# Patient Record
Sex: MALE | Race: WHITE | NOT HISPANIC OR LATINO
[De-identification: names, ages, dates, MRNs, and addresses within clinical notes are randomized per-mention and may not be internally consistent; named-entity substitution may affect disease eponyms.]

---

## 2017-03-03 ENCOUNTER — TRANSCRIPTION ENCOUNTER (OUTPATIENT)
Age: 47
End: 2017-03-03

## 2017-03-30 ENCOUNTER — EMERGENCY (EMERGENCY)
Facility: HOSPITAL | Age: 47
LOS: 1 days | Discharge: HOME | End: 2017-03-30
Attending: EMERGENCY MEDICINE | Admitting: EMERGENCY MEDICINE
Payer: COMMERCIAL

## 2017-03-30 VITALS
SYSTOLIC BLOOD PRESSURE: 171 MMHG | TEMPERATURE: 100 F | RESPIRATION RATE: 18 BRPM | DIASTOLIC BLOOD PRESSURE: 97 MMHG | WEIGHT: 240.08 LBS | HEIGHT: 78 IN | HEART RATE: 67 BPM

## 2017-03-30 DIAGNOSIS — Z88.0 ALLERGY STATUS TO PENICILLIN: ICD-10-CM

## 2017-03-30 DIAGNOSIS — H33.021 RETINAL DETACHMENT WITH MULTIPLE BREAKS, RIGHT EYE: Chronic | ICD-10-CM

## 2017-03-30 DIAGNOSIS — R07.89 OTHER CHEST PAIN: ICD-10-CM

## 2017-03-30 DIAGNOSIS — Z98.890 OTHER SPECIFIED POSTPROCEDURAL STATES: ICD-10-CM

## 2017-03-30 DIAGNOSIS — H33.052 TOTAL RETINAL DETACHMENT, LEFT EYE: Chronic | ICD-10-CM

## 2017-03-30 DIAGNOSIS — J45.909 UNSPECIFIED ASTHMA, UNCOMPLICATED: ICD-10-CM

## 2017-03-30 LAB
ALBUMIN SERPL ELPH-MCNC: 4.5 G/DL — SIGNIFICANT CHANGE UP (ref 3.3–5)
ALP SERPL-CCNC: 73 U/L — SIGNIFICANT CHANGE UP (ref 40–120)
ALT FLD-CCNC: 30 U/L RC — SIGNIFICANT CHANGE UP (ref 10–45)
ANION GAP SERPL CALC-SCNC: 15 MMOL/L — SIGNIFICANT CHANGE UP (ref 5–17)
AST SERPL-CCNC: 27 U/L — SIGNIFICANT CHANGE UP (ref 10–40)
BASOPHILS # BLD AUTO: 0.1 K/UL — SIGNIFICANT CHANGE UP (ref 0–0.2)
BASOPHILS NFR BLD AUTO: 0.8 % — SIGNIFICANT CHANGE UP (ref 0–2)
BILIRUB SERPL-MCNC: 0.3 MG/DL — SIGNIFICANT CHANGE UP (ref 0.2–1.2)
BUN SERPL-MCNC: 11 MG/DL — SIGNIFICANT CHANGE UP (ref 7–23)
CALCIUM SERPL-MCNC: 9.4 MG/DL — SIGNIFICANT CHANGE UP (ref 8.4–10.5)
CHLORIDE SERPL-SCNC: 100 MMOL/L — SIGNIFICANT CHANGE UP (ref 96–108)
CK MB BLD-MCNC: 1.5 % — SIGNIFICANT CHANGE UP (ref 0–3.5)
CK MB CFR SERPL CALC: 1.9 NG/ML — SIGNIFICANT CHANGE UP (ref 0–6.7)
CK SERPL-CCNC: 130 U/L — SIGNIFICANT CHANGE UP (ref 30–200)
CO2 SERPL-SCNC: 28 MMOL/L — SIGNIFICANT CHANGE UP (ref 22–31)
CREAT SERPL-MCNC: 0.81 MG/DL — SIGNIFICANT CHANGE UP (ref 0.5–1.3)
EOSINOPHIL # BLD AUTO: 0.2 K/UL — SIGNIFICANT CHANGE UP (ref 0–0.5)
EOSINOPHIL NFR BLD AUTO: 2.6 % — SIGNIFICANT CHANGE UP (ref 0–6)
GAS PNL BLDV: SIGNIFICANT CHANGE UP
GLUCOSE SERPL-MCNC: 129 MG/DL — HIGH (ref 70–99)
HCT VFR BLD CALC: 42.6 % — SIGNIFICANT CHANGE UP (ref 39–50)
HGB BLD-MCNC: 14.4 G/DL — SIGNIFICANT CHANGE UP (ref 13–17)
LYMPHOCYTES # BLD AUTO: 3.2 K/UL — SIGNIFICANT CHANGE UP (ref 1–3.3)
LYMPHOCYTES # BLD AUTO: 46.9 % — HIGH (ref 13–44)
MCHC RBC-ENTMCNC: 27.3 PG — SIGNIFICANT CHANGE UP (ref 27–34)
MCHC RBC-ENTMCNC: 33.9 GM/DL — SIGNIFICANT CHANGE UP (ref 32–36)
MCV RBC AUTO: 80.5 FL — SIGNIFICANT CHANGE UP (ref 80–100)
MONOCYTES # BLD AUTO: 0.8 K/UL — SIGNIFICANT CHANGE UP (ref 0–0.9)
MONOCYTES NFR BLD AUTO: 11.4 % — SIGNIFICANT CHANGE UP (ref 2–14)
NEUTROPHILS # BLD AUTO: 2.6 K/UL — SIGNIFICANT CHANGE UP (ref 1.8–7.4)
NEUTROPHILS NFR BLD AUTO: 38.3 % — LOW (ref 43–77)
PLATELET # BLD AUTO: 177 K/UL — SIGNIFICANT CHANGE UP (ref 150–400)
POTASSIUM SERPL-MCNC: 3.8 MMOL/L — SIGNIFICANT CHANGE UP (ref 3.5–5.3)
POTASSIUM SERPL-SCNC: 3.8 MMOL/L — SIGNIFICANT CHANGE UP (ref 3.5–5.3)
PROT SERPL-MCNC: 6.9 G/DL — SIGNIFICANT CHANGE UP (ref 6–8.3)
RBC # BLD: 5.29 M/UL — SIGNIFICANT CHANGE UP (ref 4.2–5.8)
RBC # FLD: 11.8 % — SIGNIFICANT CHANGE UP (ref 10.3–14.5)
SODIUM SERPL-SCNC: 143 MMOL/L — SIGNIFICANT CHANGE UP (ref 135–145)
TROPONIN T SERPL-MCNC: <0.01 NG/ML — SIGNIFICANT CHANGE UP (ref 0–0.06)
WBC # BLD: 6.8 K/UL — SIGNIFICANT CHANGE UP (ref 3.8–10.5)
WBC # FLD AUTO: 6.8 K/UL — SIGNIFICANT CHANGE UP (ref 3.8–10.5)

## 2017-03-30 PROCEDURE — 93010 ELECTROCARDIOGRAM REPORT: CPT | Mod: 59

## 2017-03-30 PROCEDURE — 85014 HEMATOCRIT: CPT

## 2017-03-30 PROCEDURE — 80053 COMPREHEN METABOLIC PANEL: CPT

## 2017-03-30 PROCEDURE — 82435 ASSAY OF BLOOD CHLORIDE: CPT

## 2017-03-30 PROCEDURE — 85027 COMPLETE CBC AUTOMATED: CPT

## 2017-03-30 PROCEDURE — 93005 ELECTROCARDIOGRAM TRACING: CPT

## 2017-03-30 PROCEDURE — 84295 ASSAY OF SERUM SODIUM: CPT

## 2017-03-30 PROCEDURE — 83605 ASSAY OF LACTIC ACID: CPT

## 2017-03-30 PROCEDURE — 82330 ASSAY OF CALCIUM: CPT

## 2017-03-30 PROCEDURE — 84484 ASSAY OF TROPONIN QUANT: CPT

## 2017-03-30 PROCEDURE — 82553 CREATINE MB FRACTION: CPT

## 2017-03-30 PROCEDURE — 82947 ASSAY GLUCOSE BLOOD QUANT: CPT

## 2017-03-30 PROCEDURE — 94640 AIRWAY INHALATION TREATMENT: CPT

## 2017-03-30 PROCEDURE — 99284 EMERGENCY DEPT VISIT MOD MDM: CPT | Mod: 25

## 2017-03-30 PROCEDURE — 82550 ASSAY OF CK (CPK): CPT

## 2017-03-30 PROCEDURE — 84132 ASSAY OF SERUM POTASSIUM: CPT

## 2017-03-30 PROCEDURE — 82803 BLOOD GASES ANY COMBINATION: CPT

## 2017-03-30 PROCEDURE — 99285 EMERGENCY DEPT VISIT HI MDM: CPT | Mod: 25

## 2017-03-30 RX ORDER — IPRATROPIUM/ALBUTEROL SULFATE 18-103MCG
3 AEROSOL WITH ADAPTER (GRAM) INHALATION ONCE
Qty: 0 | Refills: 0 | Status: COMPLETED | OUTPATIENT
Start: 2017-03-30 | End: 2017-03-30

## 2017-03-30 RX ORDER — SODIUM CHLORIDE 9 MG/ML
1000 INJECTION INTRAMUSCULAR; INTRAVENOUS; SUBCUTANEOUS ONCE
Qty: 0 | Refills: 0 | Status: COMPLETED | OUTPATIENT
Start: 2017-03-30 | End: 2017-03-30

## 2017-03-30 RX ADMIN — SODIUM CHLORIDE 1000 MILLILITER(S): 9 INJECTION INTRAMUSCULAR; INTRAVENOUS; SUBCUTANEOUS at 03:47

## 2017-03-30 RX ADMIN — Medication 3 MILLILITER(S): at 03:47

## 2017-03-30 RX ADMIN — Medication 60 MILLIGRAM(S): at 05:16

## 2017-03-30 NOTE — ED PROVIDER NOTE - ATTENDING CONTRIBUTION TO CARE
46M w/ PMH chronic back pain, asthma p/w sob for the past 3 hours. labs, ivf, nebs, cardiac enzymes, cxr

## 2017-03-30 NOTE — ED ADULT NURSE NOTE - PSH
Recent retinal detachment, total, left  S/P gas bubble injection 05/21/2014  Recent total retinal detachment, left  S/p l;aser treatment 05/23/2014  Retinal detachment of right eye with multiple breaks  S/P repair- 05/05/2014

## 2017-03-30 NOTE — ED PROVIDER NOTE - OBJECTIVE STATEMENT
46M w/ PMH chronic back pain, asthma p/w sob for the past 3 hours. Pt woke up catching his breath, took his inhaler and walked outside to minimal relief. He had no chest pain, loc, dizziness. He feels like his throat is somewhat sore and hoarse. Denies, fever, chills, cough, runny nose.

## 2017-03-30 NOTE — ED ADULT NURSE NOTE - CHPI ED SYMPTOMS NEG
no fever/no body aches/no cough/no headache/no wheezing/no diaphoresis/no chills/no hemoptysis/no chest pain/no edema

## 2017-03-30 NOTE — ED ADULT NURSE NOTE - OBJECTIVE STATEMENT
46 year old male presenting to the ED complaining of waking up with SOB. Pt is A&O x 4, ambulates independently, VSS Pt sating 98% on RA, lung sounds clear. Pt denies chest pain, pain radiating to chest, shoulder, jaw, arm or back. Pt denies recent fever, chills, NVD. No edema of extremities noted.  Pt speaking connie, resting comfortably, no SS of respiratory distress. EGK completed, bed in low position, IV placed, labs drawn, will continue to monitor. Pt states that he took an oxycodone piror to going to bed and woke up with SOB and 'feeling like my throat is closing'. 46 year old male presenting to the ED complaining of waking up with SOB. Pt is A&O x 4, ambulates independently, VSS Pt sating 98% on RA, lung sounds clear. Pt denies chest pain, pain radiating to chest, shoulder, jaw, arm or back. Pt denies recent fever, chills, NVD. No edema of extremities noted.  Pt speaking connie, resting comfortably, no SS of respiratory distress. EKG completed, bed in low position, IV placed, labs drawn, will continue to monitor. Pt states that he took an oxycodone piror to going to bed and woke up with SOB and 'feeling like my throat is closing'.

## 2017-03-30 NOTE — ED PROVIDER NOTE - PMH
Elbow fracture, right    Lumbar disc herniation    Retinal detachment  left eye- 05/08/2014  Triceps tendinitis  right

## 2017-08-13 ENCOUNTER — TRANSCRIPTION ENCOUNTER (OUTPATIENT)
Age: 47
End: 2017-08-13

## 2017-09-29 ENCOUNTER — EMERGENCY (EMERGENCY)
Facility: HOSPITAL | Age: 47
LOS: 1 days | Discharge: HOME | End: 2017-09-29
Attending: EMERGENCY MEDICINE | Admitting: EMERGENCY MEDICINE
Payer: COMMERCIAL

## 2017-09-29 VITALS
DIASTOLIC BLOOD PRESSURE: 74 MMHG | RESPIRATION RATE: 18 BRPM | TEMPERATURE: 98 F | OXYGEN SATURATION: 99 % | SYSTOLIC BLOOD PRESSURE: 121 MMHG | HEART RATE: 62 BPM

## 2017-09-29 VITALS
RESPIRATION RATE: 16 BRPM | HEART RATE: 66 BPM | WEIGHT: 225.09 LBS | SYSTOLIC BLOOD PRESSURE: 160 MMHG | HEIGHT: 78 IN | OXYGEN SATURATION: 96 % | DIASTOLIC BLOOD PRESSURE: 98 MMHG | TEMPERATURE: 98 F

## 2017-09-29 DIAGNOSIS — H33.021 RETINAL DETACHMENT WITH MULTIPLE BREAKS, RIGHT EYE: Chronic | ICD-10-CM

## 2017-09-29 DIAGNOSIS — H33.052 TOTAL RETINAL DETACHMENT, LEFT EYE: Chronic | ICD-10-CM

## 2017-09-29 PROCEDURE — 99283 EMERGENCY DEPT VISIT LOW MDM: CPT | Mod: 25

## 2017-09-29 PROCEDURE — 94640 AIRWAY INHALATION TREATMENT: CPT

## 2017-09-29 PROCEDURE — 71020: CPT | Mod: 26

## 2017-09-29 PROCEDURE — 99284 EMERGENCY DEPT VISIT MOD MDM: CPT | Mod: 25

## 2017-09-29 PROCEDURE — 71046 X-RAY EXAM CHEST 2 VIEWS: CPT

## 2017-09-29 PROCEDURE — 93005 ELECTROCARDIOGRAM TRACING: CPT

## 2017-09-29 RX ORDER — IPRATROPIUM/ALBUTEROL SULFATE 18-103MCG
3 AEROSOL WITH ADAPTER (GRAM) INHALATION ONCE
Qty: 0 | Refills: 0 | Status: COMPLETED | OUTPATIENT
Start: 2017-09-29 | End: 2017-09-29

## 2017-09-29 RX ADMIN — Medication 3 MILLILITER(S): at 01:44

## 2017-09-29 NOTE — ED PROVIDER NOTE - NS ED ROS FT
General: No fevers / chills  HENT: No head trauma, ear pain, runny nose, or sore throat  Eyes: No visual changes  CP: No chest pain, palpitations, or light headedness  Resp: +chest tightness, no cough  GI: No abdominal pain, diarrhea, constipation, nausea, or vomiting  : No urinary fz, dysuria, or hematuria  Neuro: No numbness, tingling, or weakness  Endo: No hx of diabetes  Heme: No hx of easy bleeding or bruising

## 2017-09-29 NOTE — ED PROVIDER NOTE - PROGRESS NOTE DETAILS
Discussion w. pt after gone to XR. Reports feeling improved after nebulizer. Has had EKG / CXR both negative acute. Pt would like to go, discussed w/ attg recommends orthostatics. HANG Hillman PGY1 Pt re-evaluated prior to discharge with ambulatory O2s; did not desaturate and was asymptomatic. Reported feeling that he was "ready to go". HANG Hillman PGY1

## 2017-09-29 NOTE — ED PROVIDER NOTE - OBJECTIVE STATEMENT
The patient reports that he is currently scheduled to have a broken tooth removed tomorrow morning. In the interim he was prescribed The patient reports that he is currently scheduled to have a broken tooth removed tomorrow morning -- in the interim he was prescribed 15mg oxycodone-- he took first dose at 21:00, second dose at 23:00. He now presents to the ED for evaluation of difficulties breathing. He does have a hx of asthma and states his chest is tight. No fevers, chills, chest pain, abdominal pain, nausea, vomiting, headache, visual changes, difficulties walking, numbness, tingling, weakness, or other sx of concern at this time. No throat closure/skin rash/itchiness. He is scheduled for removal of his tooth at 9AM.

## 2017-09-29 NOTE — ED PROVIDER NOTE - MEDICAL DECISION MAKING DETAILS
47yoM w/ hx of asthma and recent overuse of opioid medication to tx dental pain, now with difficulties breathing. Clinically the patient does not appear severely ill but is notably concerned that he will not continue to be able to breathe well 2/2 possible opioid overuse. Discussion is had of the medication half life. On exam patient is not wheezing but states he feels tight and a duoneb is administered which the pt reports gives him significant relief. He does feel improved and is requesting discharge after CXR/EKG return without findings for acute pathology. Discussed that the half life of the medication would warrant further observation and the patient is watched until 3AM-- 4 hours after the second dose of the medication -- at that time the patient reports he was significantly improved and felt well. Return precautions were discussed and the patient agreed to return for any symptoms of concern.

## 2017-09-29 NOTE — ED ADULT NURSE NOTE - OBJECTIVE STATEMENT
47 year old male patient presents to ED c/o SOB since 11pm. Patient takes 15mg oxycodone BID for back pain, and took an extra dose at 10pm tonight because he cracked his tooth - (pt took two other doses of 15mg at 10am and 7pm). Pt states the difficulty breathing started shortly after when he tried to lay down to go to sleep. Pt reports SOB is worse while laying down, and he noticed he had a hoarse voice shortly after.  Pt took albuterol inhaler with little change in breathing. Denies associated chest pain, nasuea, vomiting, wheezing, itchiness, HA, rash, numbness or tingling.  Pt able to speak in full sentences without becoming short of breath. Denies previous similar episodes with using oxycodone.

## 2017-09-29 NOTE — ED PROVIDER NOTE - PHYSICAL EXAMINATION
Gen: NAD, non-toxic, conversational  Eyes: PERRL, EOMI   HENT: Normocephalic, atraumatic. External ears normal, no rhinorrhea, moist mucous membranes. Left lower molar / teeth with poor dentition, fractured left inferior molar, no erythema, no obvious swelling or fluctuance.   CV: RRR, no M/R/G  Resp: CTAB, non-labored, speaking without difficulty on room air  Abd: soft, non tender, non rigid, no guarding or rebound tenderness  Skin: dry, wwp   Neuro: AOx3, speech is fluent and appropriate  Psych: Mood concerned, affect euthymic

## 2017-09-29 NOTE — ED PROVIDER NOTE - ATTENDING CONTRIBUTION TO CARE
I was physically present for the E/M service provided. I agree with above history, physical, and plan which I have reviewed and edited where appropriate. I was physically present for the key portions of the service provided.    56M h/o asthma took double dose Oxycodone prior to bed and felt BOB. Now improved. Lungs CTA. No Wheezing, no rhonci. CXR Clear. EKG non-ischemic x1. Sx resolved in ED. Pt observed for half-life of drug. Discussed dangers of opiate use including death and disability with patient.

## 2017-09-29 NOTE — ED ADULT NURSE REASSESSMENT NOTE - NS ED NURSE REASSESS COMMENT FT1
Patient reports improvement in breathing and chest tightness after duoneb. Ambulatory and not in apparent distress. Awaiting MD reevaluation

## 2018-02-13 ENCOUNTER — APPOINTMENT (OUTPATIENT)
Dept: ORTHOPEDIC SURGERY | Facility: CLINIC | Age: 48
End: 2018-02-13
Payer: COMMERCIAL

## 2018-02-13 VITALS
SYSTOLIC BLOOD PRESSURE: 121 MMHG | HEART RATE: 69 BPM | HEIGHT: 78 IN | WEIGHT: 230 LBS | DIASTOLIC BLOOD PRESSURE: 81 MMHG | BODY MASS INDEX: 26.61 KG/M2

## 2018-02-13 DIAGNOSIS — S89.92XA UNSPECIFIED INJURY OF LEFT LOWER LEG, INITIAL ENCOUNTER: ICD-10-CM

## 2018-02-13 PROCEDURE — 99214 OFFICE O/P EST MOD 30 MIN: CPT

## 2018-02-14 ENCOUNTER — FORM ENCOUNTER (OUTPATIENT)
Age: 48
End: 2018-02-14

## 2018-02-14 ENCOUNTER — TRANSCRIPTION ENCOUNTER (OUTPATIENT)
Age: 48
End: 2018-02-14

## 2018-02-15 ENCOUNTER — APPOINTMENT (OUTPATIENT)
Dept: MRI IMAGING | Facility: CLINIC | Age: 48
End: 2018-02-15
Payer: COMMERCIAL

## 2018-02-15 ENCOUNTER — OUTPATIENT (OUTPATIENT)
Dept: OUTPATIENT SERVICES | Facility: HOSPITAL | Age: 48
LOS: 1 days | End: 2018-02-15
Payer: COMMERCIAL

## 2018-02-15 DIAGNOSIS — H33.021 RETINAL DETACHMENT WITH MULTIPLE BREAKS, RIGHT EYE: Chronic | ICD-10-CM

## 2018-02-15 DIAGNOSIS — H33.052 TOTAL RETINAL DETACHMENT, LEFT EYE: Chronic | ICD-10-CM

## 2018-02-15 DIAGNOSIS — S89.92XA UNSPECIFIED INJURY OF LEFT LOWER LEG, INITIAL ENCOUNTER: ICD-10-CM

## 2018-02-15 PROCEDURE — 73721 MRI JNT OF LWR EXTRE W/O DYE: CPT

## 2018-02-15 PROCEDURE — 73721 MRI JNT OF LWR EXTRE W/O DYE: CPT | Mod: 26,LT

## 2018-02-20 ENCOUNTER — RESULT REVIEW (OUTPATIENT)
Age: 48
End: 2018-02-20

## 2018-03-19 ENCOUNTER — APPOINTMENT (OUTPATIENT)
Dept: ORTHOPEDIC SURGERY | Facility: CLINIC | Age: 48
End: 2018-03-19
Payer: COMMERCIAL

## 2018-03-19 VITALS
BODY MASS INDEX: 26.61 KG/M2 | HEART RATE: 68 BPM | HEIGHT: 78 IN | SYSTOLIC BLOOD PRESSURE: 132 MMHG | DIASTOLIC BLOOD PRESSURE: 85 MMHG | WEIGHT: 230 LBS

## 2018-03-19 DIAGNOSIS — M17.12 UNILATERAL PRIMARY OSTEOARTHRITIS, LEFT KNEE: ICD-10-CM

## 2018-03-19 PROCEDURE — 99213 OFFICE O/P EST LOW 20 MIN: CPT

## 2018-04-16 ENCOUNTER — APPOINTMENT (OUTPATIENT)
Dept: ORTHOPEDIC SURGERY | Facility: CLINIC | Age: 48
End: 2018-04-16

## 2018-10-11 NOTE — ED PROVIDER NOTE - TOBACCO USE
Never smoker
For information on Fall & Injury Prevention, visit [email or web address]

## 2018-12-23 ENCOUNTER — TRANSCRIPTION ENCOUNTER (OUTPATIENT)
Age: 48
End: 2018-12-23

## 2019-01-24 ENCOUNTER — APPOINTMENT (OUTPATIENT)
Dept: ORTHOPEDIC SURGERY | Facility: CLINIC | Age: 49
End: 2019-01-24

## 2019-02-06 ENCOUNTER — APPOINTMENT (OUTPATIENT)
Dept: MRI IMAGING | Facility: CLINIC | Age: 49
End: 2019-02-06

## 2019-03-18 ENCOUNTER — APPOINTMENT (OUTPATIENT)
Dept: ORTHOPEDIC SURGERY | Facility: CLINIC | Age: 49
End: 2019-03-18
Payer: COMMERCIAL

## 2019-03-18 VITALS
SYSTOLIC BLOOD PRESSURE: 138 MMHG | HEIGHT: 78 IN | HEART RATE: 82 BPM | DIASTOLIC BLOOD PRESSURE: 91 MMHG | BODY MASS INDEX: 26.61 KG/M2 | WEIGHT: 230 LBS

## 2019-03-18 DIAGNOSIS — M72.2 PLANTAR FASCIAL FIBROMATOSIS: ICD-10-CM

## 2019-03-18 PROCEDURE — 73630 X-RAY EXAM OF FOOT: CPT | Mod: LT

## 2019-03-18 PROCEDURE — 99214 OFFICE O/P EST MOD 30 MIN: CPT

## 2019-05-18 ENCOUNTER — APPOINTMENT (OUTPATIENT)
Dept: MRI IMAGING | Facility: CLINIC | Age: 49
End: 2019-05-18

## 2019-11-04 ENCOUNTER — TRANSCRIPTION ENCOUNTER (OUTPATIENT)
Age: 49
End: 2019-11-04

## 2019-11-14 ENCOUNTER — EMERGENCY (EMERGENCY)
Facility: HOSPITAL | Age: 49
LOS: 1 days | Discharge: HOME | End: 2019-11-14
Attending: EMERGENCY MEDICINE
Payer: COMMERCIAL

## 2019-11-14 VITALS
RESPIRATION RATE: 18 BRPM | SYSTOLIC BLOOD PRESSURE: 197 MMHG | TEMPERATURE: 98 F | WEIGHT: 259.93 LBS | OXYGEN SATURATION: 97 % | HEIGHT: 78 IN | DIASTOLIC BLOOD PRESSURE: 115 MMHG | HEART RATE: 79 BPM

## 2019-11-14 DIAGNOSIS — H33.052 TOTAL RETINAL DETACHMENT, LEFT EYE: Chronic | ICD-10-CM

## 2019-11-14 DIAGNOSIS — H33.021 RETINAL DETACHMENT WITH MULTIPLE BREAKS, RIGHT EYE: Chronic | ICD-10-CM

## 2019-11-14 PROCEDURE — 99284 EMERGENCY DEPT VISIT MOD MDM: CPT

## 2019-11-14 NOTE — ED ADULT TRIAGE NOTE - CHIEF COMPLAINT QUOTE
took oxycodone 30 mins ago (not the first dose), now feels throat swelling; slip and fall last week - has left knee and left ankle and right wrist; feels sob

## 2019-11-15 VITALS
HEART RATE: 68 BPM | RESPIRATION RATE: 16 BRPM | DIASTOLIC BLOOD PRESSURE: 84 MMHG | OXYGEN SATURATION: 96 % | TEMPERATURE: 98 F | SYSTOLIC BLOOD PRESSURE: 145 MMHG

## 2019-11-15 PROCEDURE — 71045 X-RAY EXAM CHEST 1 VIEW: CPT | Mod: 26

## 2019-11-15 PROCEDURE — 71045 X-RAY EXAM CHEST 1 VIEW: CPT

## 2019-11-15 PROCEDURE — 93971 EXTREMITY STUDY: CPT | Mod: 26

## 2019-11-15 PROCEDURE — 73562 X-RAY EXAM OF KNEE 3: CPT

## 2019-11-15 PROCEDURE — 73130 X-RAY EXAM OF HAND: CPT

## 2019-11-15 PROCEDURE — 93971 EXTREMITY STUDY: CPT

## 2019-11-15 PROCEDURE — 73130 X-RAY EXAM OF HAND: CPT | Mod: 26,RT

## 2019-11-15 PROCEDURE — 99284 EMERGENCY DEPT VISIT MOD MDM: CPT | Mod: 25

## 2019-11-15 PROCEDURE — 73562 X-RAY EXAM OF KNEE 3: CPT | Mod: 26,LT

## 2019-11-15 NOTE — ED PROVIDER NOTE - OBJECTIVE STATEMENT
50 yo m pmh herniated discs, pw resolved episode sob. pt reports he had brief episode at approximately 0030 today, unprovoked, while at rest. reports one week previously he had 50 yo m pmh herniated discs, pw resolved episode sob. pt reports he had brief episode at approximately 0030 today, unprovoked, while at rest. reports one week previously he had suffered mechanical fall over curb suffering left knee pain and LLE pain. reports r wrist pain which he was evaluated for at urgent care. pending MRI for knee. denies cp, f/c. reports took one oxycodone at 2100 for chronic back pain which he has used in past.

## 2019-11-15 NOTE — ED PROVIDER NOTE - CLINICAL SUMMARY MEDICAL DECISION MAKING FREE TEXT BOX
50 yo m pw resolve episode sob. no cp. no tachypnea, tachycardia, hypoxia. perc negative. will obtain cxr, xr of injured extremities. dvt study due to slight difference in swelling of left LE compared to right. pt has good outpatient fu. dc w/ strict return precautions.

## 2019-11-15 NOTE — ED PROVIDER NOTE - NS ED ROS FT
GENERAL: No fever or chills, //             EYES: no change in vision, //             HEENT: no trouble swallowing or speaking, //             CARDIAC: no chest pain, //              PULMONARY: resolve sob, //             GI: no abdominal pain, no nausea or no vomiting, no diarrhea or constipation, //             : No changes in urination,  //            SKIN: no rashes,  //            NEURO: no headache,  //             MSK: knee pain. ~Carlos Hagen DO PGY2

## 2019-11-15 NOTE — ED ADULT NURSE NOTE - NSIMPLEMENTINTERV_GEN_ALL_ED
Implemented All Universal Safety Interventions:  [place] to call system. Call bell, personal items and telephone within reach. Instruct patient to call for assistance. Room bathroom lighting operational. Non-slip footwear when patient is off stretcher. Physically safe environment: no spills, clutter or unnecessary equipment. Stretcher in lowest position, wheels locked, appropriate side rails in place.

## 2019-11-15 NOTE — ED ADULT NURSE NOTE - OBJECTIVE STATEMENT
48 y/o male A&Ox3 presents to ED for SOB and bruising to b/l ankles. As per pt, fell a few days ago on to right wrist and b/l knees. Pt had negative x-ray results at Urgent Care and no difficulty with ambulating or bearing weight. +ROMx4 extremities, +sensation to touch x4 extremities, no numbness, tingling or weakness. +swelling to right wrist, no redness or bruising noted Increased bruising to b/l knees and b/l ankles, +swelling, no noted wounds or redness. Pt currently denies SOB, non labored respirations, no noted wheezing, cough or edema. Denies CP, fevers, chills, n/v/d. Safety measures maintained with bed in low position and side rails up.

## 2019-11-15 NOTE — ED PROVIDER NOTE - PHYSICAL EXAMINATION
General: well appearing, interactive, well nourished, no apparent distress, ncat  HEENT: EOMI, PERRLA, normal mucosa, normal oropharynx, no lesions on the lips or on oral mucosa, normal external ear  Neck: supple, no lymphadenopathy, full range of motion, no nuchal rigidity  CV: RRR, normal S1 and S2 with no murmur, capillary refill less than two seconds, pp 2+  Resp: lungs CTA b/l, good aeration bilaterally, symmetric chest wall   Abd: non-distended, soft, non-tender  : no CVA tenderness  MSK: full range of motion, 1 + pitting edema of LE b/l,  Neuro: CN II-XII grossly intact, muscle strength 5/5 in all extremities, normal gait  Skin: ecchymoses to medial aspect of knee

## 2019-11-15 NOTE — ED PROVIDER NOTE - ATTENDING CONTRIBUTION TO CARE
Afebrile. Awake and Alert. Lungs CTA. Heart RRR. Abdomen soft NTND. CN II-XII grossly intact. Moves all extremities without lateralization. Left knee swelling with slightly reduced FROM. No calf swelling.    Transient 20 minutes SOB resolved PTA. No orthopnea, no exertional Sx, no CP. Afebrile. Awake and Alert. Lungs CTA. Heart RRR. Abdomen soft NTND. CN II-XII grossly intact. Moves all extremities without lateralization. Left knee swelling with preserved FROM. Mild LLE calf swelling non-pitting.    Transient 20 minutes SOB resolved PTA. No orthopnea, no exertional Sx, no CP. Occurred after eating and taking oxycodone. Self resolved. No current signs of allergic reaction.   r/o DVT LLE

## 2019-11-15 NOTE — ED PROVIDER NOTE - CARE PLAN
Principal Discharge DX:	SOB (shortness of breath) Principal Discharge DX:	SOB (shortness of breath)  Secondary Diagnosis:	Contusion of left knee, initial encounter

## 2019-11-15 NOTE — ED PROVIDER NOTE - NSFOLLOWUPINSTRUCTIONS_ED_ALL_ED_FT
1) Please follow up with your Primary Care Provider in 24-48 hours  2) Seek immediate medical care for any new or returning symptoms including but not limited severe pain, difficulty breathing, high fevers  3) Take Tylenol 650 mg every 4-6 hours as needed for pain. Do not take more than 2 grams within a 24 hour period

## 2019-11-15 NOTE — ED PROVIDER NOTE - PROGRESS NOTE DETAILS
pt eloped w/o receiving final reads. attempted to call pt at number in chart, no answer. prior to elopement pt stated he felt better and was ambulating throughout dept independently.  - Carlos Hagen D.O. PGY2

## 2020-02-05 ENCOUNTER — APPOINTMENT (OUTPATIENT)
Dept: ORTHOPEDIC SURGERY | Facility: CLINIC | Age: 50
End: 2020-02-05
Payer: COMMERCIAL

## 2020-02-05 VITALS
WEIGHT: 299 LBS | DIASTOLIC BLOOD PRESSURE: 77 MMHG | SYSTOLIC BLOOD PRESSURE: 156 MMHG | HEART RATE: 70 BPM | BODY MASS INDEX: 34.59 KG/M2 | HEIGHT: 78 IN

## 2020-02-05 PROCEDURE — 73564 X-RAY EXAM KNEE 4 OR MORE: CPT | Mod: RT

## 2020-02-05 PROCEDURE — 99213 OFFICE O/P EST LOW 20 MIN: CPT

## 2020-02-05 NOTE — HISTORY OF PRESENT ILLNESS
[de-identified] : Mr. NIKOS MCMAHON is a 49 year male who presents to office complaining of right knee pain.\par Patient says that back on November 4th, 2019, patient slipped and fell on ice outside, landing on his right knee. He thought the pain would go away on its own, but the pain has only gotten worse over time. At the time of this fall, he also hurt his left knee, which at first was the "bad knee", but this knee feels much better now, and it is now his right knee that is the "bad knee". He thinks perhaps he was walking differently and favoring his left knee, thereby putting much more stress and pressure on his right knee. Patient also mentions being currently treated for a left foot/ankle swelling and left thigh cellulitis, further exacerbating the increased stress placed upon his right knee.\par Pain is located on the superomedial aspect of his right knee. It is described as a general deep, burning pain which becomes sharp, shooting with activity such as weightbearing, ambulating, going up and down stairs, etc.\par Pain does not radiate from this area. Patient denies numbness/tingling or distal neuropathy.\par Patient has been extensively prescribed Oxycodone 15 mg by Dr. Beverly Dooley (sports medicine orthopedist) over the last 1-2 months, who the patient has also been seeing for his left knee/left foot and ankle.\par All review of systems, family history, social history, surgical history, past medical history, medications, and allergies not previously stated as positive are negative. They were reviewed by me today with the patient and documented accordingly.

## 2020-02-05 NOTE — DISCUSSION/SUMMARY
[de-identified] : Right knee pain\par NO NARCOTICS WERE PRESCRIBED. After checking patient's iStop, it shows he has been extensively prescribed narcotics over the last 2 months and his most recent prescription is still active, even though patient said he is out of pain medicine. He did not want any other medicine prescribed at this time other than narcotics.\par MRI Right knee ordered to rule out stress fracture.\par Patient is unable to feasibly use crutches or any non-weightbearing device at this time due to concurrent left foot/ankle pain/swelling and being prescribed a Cam Walker Boot for this by another orthopedist, thus being an increased fall risk.\par Follow up with Dr. Arita post MRI.\par All options discussed with patient.\par All questions by patient answered to their satisfaction.\par Patient expresses full understanding and agreement with plan.\par Patient is happy with the office visit.

## 2020-02-05 NOTE — PHYSICAL EXAM
[de-identified] : Right Knee: No obvious deformities, atrophy, ecchymosis, or swelling/effusion. Positive tenderness to palpation over the distal medial femoral condyle. Negative tenderness to medial/lateral joint lines, patella, popliteal fossa, distal quadriceps. patellar tendon, or tibial tubercle. Normal active and passive range of motion, including flexion to 130 degrees and extension to 0 degrees. Negative Anterior/Posterior Drawer, Lachman's, Eunice's, Pivot Shift, and Valgus/Varus Stress tests. Neurovascular status is intact.\par \par Otherwise, no apparent distress. Alert and oriented x 3. Normal mood and affect. No atrophy or swelling. 5 out of 5 motor strength. Sensation is intact and symmetrical. Normal deep tendon reflexes. Full range of motion of shoulder, elbows, hips, and knees (flexion, extension, and rotation). No palpatory tenderness or palpable lymph nodes. Negative straight leg raise. Normal finger-to-nose test. No pathological reflexes. Normal ambulation. No upper or lower extremity instability. [de-identified] : 4 views of right knee are negative for any obvious fractures or dislocations. No major osteoarthritic changes noted either. The patient understands that this is a wet read and I am not a radiologist. If the final read reveals something different than discussed in the office, then the patient will get a call back in the next 24 - 48 hours to discuss.

## 2020-02-06 DIAGNOSIS — M25.561 PAIN IN RIGHT KNEE: ICD-10-CM

## 2020-02-06 PROCEDURE — 73564 X-RAY EXAM KNEE 4 OR MORE: CPT | Mod: RT

## 2020-02-14 ENCOUNTER — APPOINTMENT (OUTPATIENT)
Dept: MRI IMAGING | Facility: CLINIC | Age: 50
End: 2020-02-14

## 2020-10-12 ENCOUNTER — LABORATORY RESULT (OUTPATIENT)
Age: 50
End: 2020-10-12

## 2020-10-12 ENCOUNTER — APPOINTMENT (OUTPATIENT)
Dept: SURGERY | Facility: CLINIC | Age: 50
End: 2020-10-12
Payer: COMMERCIAL

## 2020-10-12 VITALS
BODY MASS INDEX: 34.02 KG/M2 | WEIGHT: 294 LBS | DIASTOLIC BLOOD PRESSURE: 86 MMHG | HEIGHT: 78 IN | HEART RATE: 57 BPM | TEMPERATURE: 97 F | SYSTOLIC BLOOD PRESSURE: 136 MMHG

## 2020-10-12 PROCEDURE — 19000 PUNCTURE ASPIR CYST BREAST: CPT

## 2020-10-12 PROCEDURE — 99203 OFFICE O/P NEW LOW 30 MIN: CPT | Mod: 25

## 2020-11-12 ENCOUNTER — APPOINTMENT (OUTPATIENT)
Dept: ENDOCRINOLOGY | Facility: CLINIC | Age: 50
End: 2020-11-12
Payer: COMMERCIAL

## 2020-11-12 ENCOUNTER — LABORATORY RESULT (OUTPATIENT)
Age: 50
End: 2020-11-12

## 2020-11-12 VITALS
DIASTOLIC BLOOD PRESSURE: 82 MMHG | SYSTOLIC BLOOD PRESSURE: 118 MMHG | BODY MASS INDEX: 33.78 KG/M2 | OXYGEN SATURATION: 98 % | WEIGHT: 292 LBS | HEIGHT: 78 IN | TEMPERATURE: 96.7 F | HEART RATE: 65 BPM

## 2020-11-12 DIAGNOSIS — Z83.3 FAMILY HISTORY OF DIABETES MELLITUS: ICD-10-CM

## 2020-11-12 PROCEDURE — 99205 OFFICE O/P NEW HI 60 MIN: CPT | Mod: 25

## 2020-11-12 PROCEDURE — 99072 ADDL SUPL MATRL&STAF TM PHE: CPT

## 2020-11-13 PROBLEM — Z83.3 FAMILY HISTORY OF DIABETES MELLITUS: Status: ACTIVE | Noted: 2020-11-13

## 2020-11-13 RX ORDER — DICLOFENAC SODIUM 1% 10 MG/G
1 GEL TOPICAL
Qty: 100 | Refills: 0 | Status: COMPLETED | COMMUNITY
Start: 2020-10-11

## 2020-11-13 RX ORDER — LEVOFLOXACIN 500 MG/1
500 TABLET, FILM COATED ORAL
Qty: 10 | Refills: 0 | Status: COMPLETED | COMMUNITY
Start: 2020-10-19

## 2020-11-13 RX ORDER — CLINDAMYCIN HYDROCHLORIDE 300 MG/1
300 CAPSULE ORAL
Qty: 30 | Refills: 0 | Status: COMPLETED | COMMUNITY
Start: 2020-06-14

## 2020-11-13 RX ORDER — IBUPROFEN 600 MG/1
600 TABLET, FILM COATED ORAL
Qty: 28 | Refills: 0 | Status: COMPLETED | COMMUNITY
Start: 2020-05-18

## 2020-11-13 RX ORDER — MUPIROCIN 20 MG/G
2 OINTMENT TOPICAL
Qty: 22 | Refills: 0 | Status: COMPLETED | COMMUNITY
Start: 2020-03-16

## 2020-11-13 RX ORDER — DIAZEPAM 2 MG/1
2 TABLET ORAL
Qty: 2 | Refills: 0 | Status: COMPLETED | COMMUNITY
Start: 2020-09-17

## 2020-11-13 RX ORDER — ECONAZOLE NITRATE 10 MG/G
1 CREAM TOPICAL
Qty: 15 | Refills: 0 | Status: COMPLETED | COMMUNITY
Start: 2020-06-18

## 2020-11-16 LAB
ALBUMIN SERPL ELPH-MCNC: 4.7 G/DL
ALP BLD-CCNC: 78 U/L
ALT SERPL-CCNC: 15 U/L
ANION GAP SERPL CALC-SCNC: 15 MMOL/L
AST SERPL-CCNC: 15 U/L
BILIRUB SERPL-MCNC: 0.4 MG/DL
BUN SERPL-MCNC: 15 MG/DL
CALCIUM SERPL-MCNC: 9.2 MG/DL
CHLORIDE SERPL-SCNC: 100 MMOL/L
CO2 SERPL-SCNC: 24 MMOL/L
CORTIS SERPL-MCNC: 4.7 UG/DL
CREAT SERPL-MCNC: 0.91 MG/DL
ESTRADIOL SERPL-MCNC: 12 PG/ML
FSH SERPL-MCNC: 1.5 IU/L
GLUCOSE SERPL-MCNC: 113 MG/DL
IGF-1 INTERP: NORMAL
IGF-I BLD-MCNC: 110 NG/ML
LH SERPL-ACNC: 1.3 IU/L
POTASSIUM SERPL-SCNC: 4.2 MMOL/L
PROLACTIN SERPL-MCNC: 4297 NG/ML
PROT SERPL-MCNC: 6.6 G/DL
SHBG SERPL-SCNC: 14 NMOL/L
SODIUM SERPL-SCNC: 139 MMOL/L
T3RU NFR SERPL: 1.2 TBI
T4 SERPL-MCNC: 6.7 UG/DL
TESTOST SERPL-MCNC: 18.8 NG/DL
TSH SERPL-ACNC: 1.06 UIU/ML

## 2020-11-23 LAB
MONOMERIC PROLACTIN (ICMA)*: 3207 NG/ML
PERCENT MACROPROLACTIN: 0 %
PROLACTIN, SERUM (ICMA)*: 3207 NG/ML

## 2020-12-01 ENCOUNTER — TRANSCRIPTION ENCOUNTER (OUTPATIENT)
Age: 50
End: 2020-12-01

## 2020-12-18 ENCOUNTER — APPOINTMENT (OUTPATIENT)
Dept: ENDOCRINOLOGY | Facility: CLINIC | Age: 50
End: 2020-12-18

## 2021-01-11 ENCOUNTER — TRANSCRIPTION ENCOUNTER (OUTPATIENT)
Age: 51
End: 2021-01-11

## 2021-05-15 ENCOUNTER — TRANSCRIPTION ENCOUNTER (OUTPATIENT)
Age: 51
End: 2021-05-15

## 2021-06-29 ENCOUNTER — LABORATORY RESULT (OUTPATIENT)
Age: 51
End: 2021-06-29

## 2021-06-29 ENCOUNTER — APPOINTMENT (OUTPATIENT)
Dept: ENDOCRINOLOGY | Facility: CLINIC | Age: 51
End: 2021-06-29
Payer: COMMERCIAL

## 2021-06-29 VITALS
TEMPERATURE: 97.2 F | HEART RATE: 56 BPM | HEIGHT: 78 IN | DIASTOLIC BLOOD PRESSURE: 92 MMHG | WEIGHT: 310 LBS | BODY MASS INDEX: 35.87 KG/M2 | SYSTOLIC BLOOD PRESSURE: 142 MMHG | OXYGEN SATURATION: 98 %

## 2021-06-29 PROCEDURE — 99214 OFFICE O/P EST MOD 30 MIN: CPT

## 2021-06-29 PROCEDURE — 99072 ADDL SUPL MATRL&STAF TM PHE: CPT

## 2021-06-29 RX ORDER — ALPRAZOLAM 0.5 MG/1
0.5 TABLET ORAL
Qty: 1 | Refills: 0 | Status: ACTIVE | COMMUNITY
Start: 2021-06-29 | End: 1900-01-01

## 2021-06-29 NOTE — ASSESSMENT
[FreeTextEntry1] : 51-year-old male presents with gynecomastia.  Lab work showed a significantly elevated prolactin of over 4000.  The patient was previously noted to have extremely low testosterone but had no further evaluation.  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  The patient has no suggestion on physical examination of other pituitary insufficiencies such as adrenal insufficiency or hypothyroidism or diabetes insipidus.  Similarly there is no physical suggestions of acromegaly or Cushing's syndrome.\par Pt did not do MRI as requested and did not take Rx as requested.\par Recommend check CT, less time in machine.can pre-treat with xanax\par    Assuming there are no surprises he should begin cabergoline 0.5 mg 1 tablet twice weekly.  This is a moderately high dose but I believe I would prefer to start with this dose and then cut back as he responds.

## 2021-06-29 NOTE — HISTORY OF PRESENT ILLNESS
[FreeTextEntry1] : 51 year-old male referred for management of hyperprolactinemia.Seen 11/2020 did not go for imaging as requested. Took cabergoline for few weeks only and stopped for unclear reasons. still  c/o gynecomastia. No HA change in VF. No recent ophtho sched for 2 weeks. h/o prior left retinal tear with some change in vision/ fileds\par   The patient has been generally good health.  He recently developed gynecomastia and was found on examination to have a subareolar cyst on the right.  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  Further evaluation included a prolactin level of 4, 482.. The patient has been sent for an MRI of the pituitary but this has not occurred yet.\par The patient has no previous history of pituitary disease.  He has not been on drugs that raise prolactin.  He did have blood test in the spring which showed a quite low testosterone of 20 but did not have any further evaluation.  He does note a change in sexual function but denies any other symptoms suggestive pituitary hyperfunction or hypofunction.  He has no symptoms of Cushing's disease acromegaly diabetes insipidus etc.\par It is difficult to assess visual fields as the patient has history of decreased vision on the left due to previous retinal tear.\par The patient does note some headaches which are fairly nonspecific and chronic.  He has been on oxycodone for back pain which could lower testosterone\par

## 2021-06-29 NOTE — PHYSICAL EXAM
[Alert] : alert [Well Nourished] : well nourished [No Acute Distress] : no acute distress [Well Developed] : well developed [Normal Sclera/Conjunctiva] : normal sclera/conjunctiva [EOMI] : extra ocular movement intact [No Proptosis] : no proptosis [Normal Oropharynx] : the oropharynx was normal [Thyroid Not Enlarged] : the thyroid was not enlarged [No Thyroid Nodules] : no palpable thyroid nodules [No Respiratory Distress] : no respiratory distress [Clear to Auscultation] : lungs were clear to auscultation bilaterally [Normal S1, S2] : normal S1 and S2 [Normal Rate] : heart rate was normal [Regular Rhythm] : with a regular rhythm [No Edema] : no peripheral edema [Pedal Pulses Normal] : the pedal pulses are present [Gynecomastia] : gynecomastia present [Normal Bowel Sounds] : normal bowel sounds [Not Tender] : non-tender [Not Distended] : not distended [Soft] : abdomen soft [Penis Abnormality] : the penis was normal [Testes Swelling] : the testicles were not swollen [Scrotum] : the scrotum was normal [Testes Mass (___cm)] : there were no testicular masses [Normal Anterior Cervical Nodes] : no anterior cervical lymphadenopathy [No Spinal Tenderness] : no spinal tenderness [Spine Straight] : spine straight [No Stigmata of Cushings Syndrome] : no stigmata of Cushings Syndrome [Normal Gait] : normal gait [No Rash] : no rash [Normal Reflexes] : deep tendon reflexes were 2+ and symmetric [No Tremors] : no tremors [Oriented x3] : oriented to person, place, and time [Acanthosis Nigricans] : no acanthosis nigricans [de-identified] : cannot  fully assess visual fields as very poor vision left [de-identified] : Bilateral gynecomastia  fungal changes skin below breasts bilat

## 2021-06-30 LAB
ALBUMIN SERPL ELPH-MCNC: 4.7 G/DL
ALP BLD-CCNC: 85 U/L
ALT SERPL-CCNC: 15 U/L
ANION GAP SERPL CALC-SCNC: 11 MMOL/L
AST SERPL-CCNC: 15 U/L
BASOPHILS # BLD AUTO: 0.06 K/UL
BASOPHILS NFR BLD AUTO: 0.5 %
BILIRUB SERPL-MCNC: 0.2 MG/DL
BUN SERPL-MCNC: 15 MG/DL
CALCIUM SERPL-MCNC: 9.6 MG/DL
CHLORIDE SERPL-SCNC: 101 MMOL/L
CO2 SERPL-SCNC: 28 MMOL/L
CORTIS SERPL-MCNC: 0.4 UG/DL
CREAT SERPL-MCNC: 0.75 MG/DL
EOSINOPHIL # BLD AUTO: 0.03 K/UL
EOSINOPHIL NFR BLD AUTO: 0.3 %
GLUCOSE SERPL-MCNC: 135 MG/DL
HCT VFR BLD CALC: 44.3 %
HGB BLD-MCNC: 13.6 G/DL
IMM GRANULOCYTES NFR BLD AUTO: 1.3 %
LYMPHOCYTES # BLD AUTO: 2.55 K/UL
LYMPHOCYTES NFR BLD AUTO: 22 %
MAN DIFF?: NORMAL
MCHC RBC-ENTMCNC: 25.2 PG
MCHC RBC-ENTMCNC: 30.7 GM/DL
MCV RBC AUTO: 82 FL
MONOCYTES # BLD AUTO: 1.3 K/UL
MONOCYTES NFR BLD AUTO: 11.2 %
NEUTROPHILS # BLD AUTO: 7.49 K/UL
NEUTROPHILS NFR BLD AUTO: 64.7 %
PLATELET # BLD AUTO: 258 K/UL
POTASSIUM SERPL-SCNC: 4.7 MMOL/L
PROLACTIN SERPL-MCNC: 4469 NG/ML
PROT SERPL-MCNC: 6.7 G/DL
RBC # BLD: 5.4 M/UL
RBC # FLD: 13.3 %
SODIUM SERPL-SCNC: 140 MMOL/L
T3RU NFR SERPL: 1.2 TBI
T4 SERPL-MCNC: 5.5 UG/DL
TESTOST SERPL-MCNC: <2.5 NG/DL
TSH SERPL-ACNC: 1.33 UIU/ML
WBC # FLD AUTO: 11.58 K/UL

## 2021-07-09 LAB
MONOMERIC PROLACTIN (ICMA)*: 3238 NG/ML
PERCENT MACROPROLACTIN: 22 %
PROLACTIN, SERUM (ICMA)*: 4137 NG/ML

## 2021-07-31 ENCOUNTER — OUTPATIENT (OUTPATIENT)
Dept: OUTPATIENT SERVICES | Facility: HOSPITAL | Age: 51
LOS: 1 days | End: 2021-07-31
Payer: COMMERCIAL

## 2021-07-31 ENCOUNTER — APPOINTMENT (OUTPATIENT)
Dept: CT IMAGING | Facility: IMAGING CENTER | Age: 51
End: 2021-07-31
Payer: COMMERCIAL

## 2021-07-31 DIAGNOSIS — E22.1 HYPERPROLACTINEMIA: ICD-10-CM

## 2021-07-31 DIAGNOSIS — H33.021 RETINAL DETACHMENT WITH MULTIPLE BREAKS, RIGHT EYE: Chronic | ICD-10-CM

## 2021-07-31 DIAGNOSIS — H33.052 TOTAL RETINAL DETACHMENT, LEFT EYE: Chronic | ICD-10-CM

## 2021-07-31 PROCEDURE — 70482 CT ORBIT/EAR/FOSSA W/O&W/DYE: CPT

## 2021-07-31 PROCEDURE — 70482 CT ORBIT/EAR/FOSSA W/O&W/DYE: CPT | Mod: 26

## 2021-08-18 ENCOUNTER — LABORATORY RESULT (OUTPATIENT)
Age: 51
End: 2021-08-18

## 2021-08-18 ENCOUNTER — APPOINTMENT (OUTPATIENT)
Dept: ENDOCRINOLOGY | Facility: CLINIC | Age: 51
End: 2021-08-18
Payer: COMMERCIAL

## 2021-08-18 VITALS
DIASTOLIC BLOOD PRESSURE: 84 MMHG | BODY MASS INDEX: 33.8 KG/M2 | TEMPERATURE: 97.2 F | WEIGHT: 300 LBS | HEART RATE: 60 BPM | OXYGEN SATURATION: 98 % | SYSTOLIC BLOOD PRESSURE: 130 MMHG

## 2021-08-18 PROCEDURE — 99214 OFFICE O/P EST MOD 30 MIN: CPT

## 2021-08-18 NOTE — HISTORY OF PRESENT ILLNESS
[FreeTextEntry1] : 50-year-old male referred for management of hyperprolactinemia.  The patient has been generally good health.  He recently developed gynecomastia and was found on examination to have a subareolar cyst on the right.  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  Further evaluation included a prolactin level of 4, 482.. The patient has been sent for an MRI of the pituitary but this has not occurred yet.\par The patient has no previous history of pituitary disease.  He has not been on drugs that raise prolactin.  He did have blood test in the spring which showed a quite low testosterone of 20 but did not have any further evaluation.  He does note a change in sexual function but denies any other symptoms suggestive pituitary hyperfunction or hypofunction.  He has no symptoms of Cushing's disease acromegaly diabetes insipidus etc.\par It is difficult to assess visual fields as the patient has history of decreased vision on the left due to previous retinal tear.\par The patient does note some headaches which are fairly nonspecific and chronic.  He has been on oxycodone for back pain which could lower testosterone\par

## 2021-08-18 NOTE — ASSESSMENT
[FreeTextEntry1] : 50-year-old male presents with gynecomastia.  Lab work showed a significantly elevated prolactin of over 4000.  The patient was previously noted to have extremely low testosterone but had no further evaluation.  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  The patient has no suggestion on physical examination of other pituitary insufficiencies such as adrenal insufficiency or hypothyroidism or diabetes insipidus.  Similarly there is no physical suggestions of acromegaly or Cushing's syndrome.\par I have requested repeat full pituitary evaluation including repeat prolactin levels as well as a macro prolactin to help exclude laboratory error.  The patient should proceed to pituitary MRI as previously scheduled.  Assuming there are no surprises he should begin cabergoline 0.5 mg 1 tablet twice weekly.  This is a moderately high dose but I believe I would prefer to start with this dose and then cut back as he responds.

## 2021-08-18 NOTE — CONSULT LETTER
[Dear  ___] : Dear  [unfilled], [Consult Letter:] : I had the pleasure of evaluating your patient, [unfilled]. [Please see my note below.] : Please see my note below. [Consult Closing:] : Thank you very much for allowing me to participate in the care of this patient.  If you have any questions, please do not hesitate to contact me. [DrZayra  ___] : Dr. ROMANO [FreeTextEntry2] : Say Poon MD\par  [address] [place] [address], \[place] [address]

## 2021-08-18 NOTE — REASON FOR VISIT
[Consultation] : a consultation visit [Pituitary Evaluation/ Disorder] : pituitary evaluation/disorder [Osteoporosis] : osteoporosis [FreeTextEntry2] : Say Poon MD

## 2021-08-18 NOTE — PHYSICAL EXAM
[Alert] : alert [Well Nourished] : well nourished [No Acute Distress] : no acute distress [Well Developed] : well developed [Normal Sclera/Conjunctiva] : normal sclera/conjunctiva [EOMI] : extra ocular movement intact [No Proptosis] : no proptosis [Normal Oropharynx] : the oropharynx was normal [Thyroid Not Enlarged] : the thyroid was not enlarged [No Thyroid Nodules] : no palpable thyroid nodules [Clear to Auscultation] : lungs were clear to auscultation bilaterally [Normal S1, S2] : normal S1 and S2 [Normal Rate] : heart rate was normal [Regular Rhythm] : with a regular rhythm [No Edema] : no peripheral edema [Pedal Pulses Normal] : the pedal pulses are present [Gynecomastia] : gynecomastia present [Normal Bowel Sounds] : normal bowel sounds [Not Tender] : non-tender [Not Distended] : not distended [Soft] : abdomen soft [Penis Abnormality] : the penis was normal [Testes Swelling] : the testicles were not swollen [Scrotum] : the scrotum was normal [Testes Mass (___cm)] : there were no testicular masses [Normal Anterior Cervical Nodes] : no anterior cervical lymphadenopathy [Normal Posterior Cervical Nodes] : no posterior cervical lymphadenopathy [No Spinal Tenderness] : no spinal tenderness [Spine Straight] : spine straight [No Stigmata of Cushings Syndrome] : no stigmata of Cushings Syndrome [Normal Gait] : normal gait [No Rash] : no rash [Normal Reflexes] : deep tendon reflexes were 2+ and symmetric [No Tremors] : no tremors [Oriented x3] : oriented to person, place, and time [Acanthosis Nigricans] : no acanthosis nigricans [de-identified] : cannot  fully assess visual fields as very poor vision left [de-identified] : Bilateral gynecomastia with 1 cm cystic feeling lesion below right nipple

## 2021-08-19 LAB
ALBUMIN SERPL ELPH-MCNC: 4.7 G/DL
ALP BLD-CCNC: 84 U/L
ALT SERPL-CCNC: 15 U/L
ANION GAP SERPL CALC-SCNC: 12 MMOL/L
AST SERPL-CCNC: 14 U/L
BILIRUB SERPL-MCNC: 0.2 MG/DL
BUN SERPL-MCNC: 17 MG/DL
CALCIUM SERPL-MCNC: 9.3 MG/DL
CHLORIDE SERPL-SCNC: 104 MMOL/L
CO2 SERPL-SCNC: 26 MMOL/L
CREAT SERPL-MCNC: 0.85 MG/DL
GLUCOSE SERPL-MCNC: 114 MG/DL
POTASSIUM SERPL-SCNC: 4.1 MMOL/L
PROLACTIN SERPL-MCNC: 2097 NG/ML
PROT SERPL-MCNC: 6.9 G/DL
SODIUM SERPL-SCNC: 141 MMOL/L
T3RU NFR SERPL: 1.2 TBI
T4 SERPL-MCNC: 6.5 UG/DL
TESTOST SERPL-MCNC: <2.5 NG/DL
TSH SERPL-ACNC: 1.55 UIU/ML

## 2021-10-19 ENCOUNTER — LABORATORY RESULT (OUTPATIENT)
Age: 51
End: 2021-10-19

## 2021-10-19 ENCOUNTER — APPOINTMENT (OUTPATIENT)
Dept: ENDOCRINOLOGY | Facility: CLINIC | Age: 51
End: 2021-10-19
Payer: COMMERCIAL

## 2021-10-19 VITALS
OXYGEN SATURATION: 98 % | DIASTOLIC BLOOD PRESSURE: 84 MMHG | TEMPERATURE: 98 F | BODY MASS INDEX: 35.17 KG/M2 | HEART RATE: 80 BPM | HEIGHT: 78 IN | SYSTOLIC BLOOD PRESSURE: 126 MMHG | WEIGHT: 304 LBS

## 2021-10-19 PROCEDURE — 99214 OFFICE O/P EST MOD 30 MIN: CPT

## 2021-10-20 NOTE — REVIEW OF SYSTEMS
[Fatigue] : fatigue [Dizziness] : dizziness [Insomnia] : insomnia [Negative] : Heme/Lymph [Decreased Appetite] : appetite not decreased [Recent Weight Gain (___ Lbs)] : no recent weight gain [Recent Weight Loss (___ Lbs)] : no recent weight loss [Fever] : no fever [Chills] : no chills [Eye Pain] : no pain [Dysphagia] : no dysphagia [Neck Pain] : no neck pain [Dysphonia] : no dysphonia [Chest Pain] : no chest pain [Palpitations] : no palpitations [Shortness Of Breath] : no shortness of breath [Cough] : no cough [Nausea] : no nausea [Constipation] : no constipation [Abdominal Pain] : no abdominal pain [Vomiting] : no vomiting [Diarrhea] : no diarrhea [Polyuria] : no polyuria [Dysuria] : no dysuria [Headaches] : no headaches [Tremors] : no tremors [Pain/Numbness of Digits] : no pain/numbness of digits [Depression] : no depression [Polydipsia] : no polydipsia [Cold Intolerance] : no cold intolerance [Heat Intolerance] : no heat intolerance [Galactorrhea] : no galactorrhea  [de-identified] : +gynecomastia b/l w/o galactorrhea

## 2021-10-20 NOTE — END OF VISIT
[FreeTextEntry3] : I, Long Garland, authored this note working as a medical scribe for Dr. Ngo.  10/19/2021. 11:15AM. This note was authored by the medical scribe for me. I have reviewed, edited, and revised the note as needed. I am in agreement with the exam findings, imaging findings, and treatment plan.  Jacob Ngo MD

## 2021-10-20 NOTE — HISTORY OF PRESENT ILLNESS
[FreeTextEntry1] : No significant interval health changes. No interval surgery, hospitalizations, fractures, or change in medications.\par \par States that he has been compliant with Cabergoline 0.5mg PO twice weekly as well as prednisone 5mg daily which was advised after he was intially found to have a cortisol level of 0.4 on prior labs. \par \par Still c/o gynecomastia, b/l. States that he feels that he feels fullness in both of his breast, subareolar areas w/ mild tenderness per pt. No drainage or galactorrhea noted.\par No HA change in VF. Up to date w/ optho. H/o prior left retinal tear with some change in vision/ fields. Still feeling mild dizziness at times and chronic fatigue.\par \par The patient has been generally good health.  He previously developed gynecomastia and was found on examination to have a subareolar cyst on the right.  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  Further evaluation included a prolactin level of 4,482. The patient has been sent for an MRI of the pituitary but this has not occurred yet.\par \par The patient has no previous history of pituitary disease.  He has not been on drugs that raise prolactin.  He did have blood test in the spring which showed a quite low testosterone of 20 but did not have any further evaluation.  He does note a change in sexual function, no morning erections, but denies any other symptoms suggestive pituitary hyperfunction or hypofunction.  He has no symptoms of Cushing's disease acromegaly diabetes insipidus etc.\par \par It is difficult to assess visual fields as the patient has history of decreased vision on the left due to previous retinal tear. Advised to follow up with opthalmology. Has not seen an eye doctor recently per pt. No recent issues/changes with vision that patient has noticed. He has been on oxycodone for back pain which could lower testosterone as well but discussed that with improvement in prolactin levels, testosterone levels may also improve. \par \par Recently been having a lot of life stressors w/ elderly mother and being a single parent to a 21 year old. \par \par CT 7/2021 consistent with a pituitary macro adenoma. The mass measures 2.9 x 2.4 x 2.7 cm.

## 2021-10-20 NOTE — PHYSICAL EXAM
[Alert] : alert [Well Nourished] : well nourished [Obese] : obese [No Acute Distress] : no acute distress [Well Developed] : well developed [Normal Sclera/Conjunctiva] : normal sclera/conjunctiva [EOMI] : extra ocular movement intact [No Proptosis] : no proptosis [Normal Outer Ear/Nose] : the ears and nose were normal in appearance [Normal Hearing] : hearing was normal [Normal Oropharynx] : the oropharynx was normal [Supple] : the neck was supple [Thyroid Not Enlarged] : the thyroid was not enlarged [No Thyroid Nodules] : no palpable thyroid nodules [No Respiratory Distress] : no respiratory distress [Clear to Auscultation] : lungs were clear to auscultation bilaterally [Normal S1, S2] : normal S1 and S2 [Normal Rate] : heart rate was normal [Regular Rhythm] : with a regular rhythm [No Edema] : no peripheral edema [Pedal Pulses Normal] : the pedal pulses are present [Gynecomastia] : gynecomastia present [Normal Bowel Sounds] : normal bowel sounds [Not Tender] : non-tender [Not Distended] : not distended [Soft] : abdomen soft [Penis Abnormality] : the penis was normal [Testes Swelling] : the testicles were not swollen [Scrotum] : the scrotum was normal [Testes Mass (___cm)] : there were no testicular masses [Normal Anterior Cervical Nodes] : no anterior cervical lymphadenopathy [No Spinal Tenderness] : no spinal tenderness [Spine Straight] : spine straight [No Stigmata of Cushings Syndrome] : no stigmata of Cushings Syndrome [Normal Gait] : normal gait [No Rash] : no rash [Normal Reflexes] : deep tendon reflexes were 2+ and symmetric [No Tremors] : no tremors [Oriented x3] : oriented to person, place, and time [Abdominal Striae] : no abdominal striae [Acanthosis Nigricans] : no acanthosis nigricans [Hirsutism] : no hirsutism [de-identified] : cannot  fully assess visual fields as very poor vision left [de-identified] : nontender  [de-identified] : Bilateral gynecomastia w/ subareolar fullness b/l approximately 1-2cm in diameter, symmetrical   [de-identified] : erythematous and anetodermas left LE

## 2021-10-20 NOTE — ASSESSMENT
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  \par \par #Hyperprolactinemia 2/ Invasive Pituitary Macroadenoma \par #Low Testosterone due to Secondary Hypogonadism \par #Possible Secondary AI & Hypocortisolemia \par - Elevated prolactin 4469 in 6/2021\par - Testosterone < 2.5 in 6/2021  \par - Cortisol in 6/2021 (collected at 10:28AM) low at 0.4, subsequently started on Prednisone 5mg daily which pt endorses compliance w/ \par - No evidence of hypothyroidism or diabetes insipidus at this time.  Similarly there is no physical suggestions of acromegaly or Cushing's syndrome.\par - Pt did not do MRI as requested due to inability to stay still for imaging study \par - CT Pituitary w/ and w/o IV contrast done on 7/31/2021- report reviewed - invasive pit macroadenoma with no chiasmal compression noted \par Plan: \par - Repeat TFTs, prolactin level, testosterone level, and CMP today \par - Continue Prednisone 5mg daily \par - If prolactin still increased on repeat labs, consider increasing Cabergoline 0.5mg to three times weekly\par - ophtho f/u\par F/u in 2 months\par

## 2021-10-21 LAB
ALBUMIN SERPL ELPH-MCNC: 4.7 G/DL
ALP BLD-CCNC: 79 U/L
ALT SERPL-CCNC: 15 U/L
ANION GAP SERPL CALC-SCNC: 14 MMOL/L
AST SERPL-CCNC: 18 U/L
BASOPHILS # BLD AUTO: 0.04 K/UL
BASOPHILS NFR BLD AUTO: 0.4 %
BILIRUB SERPL-MCNC: 0.5 MG/DL
BUN SERPL-MCNC: 11 MG/DL
CALCIUM SERPL-MCNC: 9.6 MG/DL
CHLORIDE SERPL-SCNC: 100 MMOL/L
CO2 SERPL-SCNC: 26 MMOL/L
CREAT SERPL-MCNC: 0.77 MG/DL
EOSINOPHIL # BLD AUTO: 0.16 K/UL
EOSINOPHIL NFR BLD AUTO: 1.8 %
GLUCOSE SERPL-MCNC: 112 MG/DL
HCT VFR BLD CALC: 46 %
HGB BLD-MCNC: 14 G/DL
IMM GRANULOCYTES NFR BLD AUTO: 0.8 %
LYMPHOCYTES # BLD AUTO: 1.73 K/UL
LYMPHOCYTES NFR BLD AUTO: 19 %
MAN DIFF?: NORMAL
MCHC RBC-ENTMCNC: 25.3 PG
MCHC RBC-ENTMCNC: 30.4 GM/DL
MCV RBC AUTO: 83 FL
MONOCYTES # BLD AUTO: 0.72 K/UL
MONOCYTES NFR BLD AUTO: 7.9 %
NEUTROPHILS # BLD AUTO: 6.39 K/UL
NEUTROPHILS NFR BLD AUTO: 70.1 %
PLATELET # BLD AUTO: 271 K/UL
POTASSIUM SERPL-SCNC: 4.5 MMOL/L
PROLACTIN SERPL-MCNC: 2099 NG/ML
PROT SERPL-MCNC: 7 G/DL
RBC # BLD: 5.54 M/UL
RBC # FLD: 13.8 %
SODIUM SERPL-SCNC: 141 MMOL/L
T3RU NFR SERPL: 1.2 TBI
T4 SERPL-MCNC: 7 UG/DL
TESTOST SERPL-MCNC: 21.6 NG/DL
TSH SERPL-ACNC: 1.18 UIU/ML
WBC # FLD AUTO: 9.11 K/UL

## 2021-12-08 ENCOUNTER — APPOINTMENT (OUTPATIENT)
Dept: ENDOCRINOLOGY | Facility: CLINIC | Age: 51
End: 2021-12-08
Payer: COMMERCIAL

## 2021-12-08 VITALS
WEIGHT: 299 LBS | HEART RATE: 81 BPM | TEMPERATURE: 98 F | SYSTOLIC BLOOD PRESSURE: 132 MMHG | DIASTOLIC BLOOD PRESSURE: 88 MMHG | BODY MASS INDEX: 33.68 KG/M2 | OXYGEN SATURATION: 98 %

## 2021-12-08 PROCEDURE — 99214 OFFICE O/P EST MOD 30 MIN: CPT

## 2021-12-08 NOTE — END OF VISIT
[FreeTextEntry3] : I, Long Garland, authored this note working as a medical scribe for Dr. Ngo.  12/08/2021. 12:00PM. This note was authored by the medical scribe for me. I have reviewed, edited, and revised the note as needed. I am in agreement with the exam findings, imaging findings, and treatment plan.  Jacob Ngo MD

## 2021-12-08 NOTE — HISTORY OF PRESENT ILLNESS
[FreeTextEntry1] : No significant interval health changes. No interval surgery, hospitalizations, fractures, or change in medications.\par \par States that he has been compliant with Cabergoline 0.5 mg PO 3 pills/weekly as well as prednisone 5 mg daily, which was advised after he was initially found to have a cortisol level of 0.4 on prior labs. No HA change in VF. Up to date w/ optho. H/o prior left retinal tear with some change in vision/ fields.\par \par The patient has been generally good health. He previously developed gynecomastia and was found on examination to have a subareolar cyst on the right. 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 Further evaluation included a prolactin level of 4,482. The patient has been sent for an MRI of the pituitary but this has not occurred yet.\par \par The patient has no previous history of pituitary disease.  He has not been on drugs that raise prolactin.  He did have blood test in the spring which showed a quite low testosterone of 20 but did not have any further evaluation.  He does note a change in sexual function, no morning erections, but denies any other symptoms suggestive pituitary hyperfunction or hypofunction.  He has no symptoms of Cushing's disease acromegaly diabetes insipidus etc.\par  He has been on oxycodone for back pain which could lower testosterone as well but discussed that with improvement in prolactin levels, testosterone levels may also improve. \par \par Has life stressors w/ elderly mother and being a single parent to a 21 year old. \par \par CT 7/2021 consistent with a pituitary macro adenoma. The mass measures 2.9 x 2.4 x 2.7 cm.

## 2021-12-08 NOTE — ASSESSMENT
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  \par \par H/o hyperprolactinemia 2/ invasive pituitary macroadenoma, low testosterone due to secondary hypogonadism, and possible secondary AI & hypocortisolemia. Cabergoline 0.5 mg PO 3 pills/weekly as well as prednisone 5 mg daily. No evidence of hypothyroidism or diabetes insipidus at this time. Similarly there is no physical suggestions of acromegaly or Cushing's syndrome. No HA change in VF. Up to date w/ optho. H/o prior left retinal tear with some change in vision/ fields.\par \par Request labs sent out. \par \par F/u in 4 months Other (Free Text): Dr. Aguilar informed pt she is healing well. Pt has been changing her bandaid everyday and therefore presents with irritated raw skin around the area today. Dr. Aguilar advised pt to stop wearing bandaids. Pt sutures were removed today. Pt was informed she is still swollen and the swelling will decrease within 2-3 weeks. Dr. Aguilar informed pt to refrain from hot yoga for one more week. Pt instructed to wear scar fading tape until it falls off and then to reapply. Pt also instructed to continue to wear the head wrap. Pt to keep in touch via phone but to RTC if any issues arise. Detail Level: Zone

## 2021-12-08 NOTE — PHYSICAL EXAM
[Alert] : alert [Well Nourished] : well nourished [Obese] : obese [No Acute Distress] : no acute distress [Well Developed] : well developed [Normal Sclera/Conjunctiva] : normal sclera/conjunctiva [EOMI] : extra ocular movement intact [No Proptosis] : no proptosis [Normal Outer Ear/Nose] : the ears and nose were normal in appearance [Normal Hearing] : hearing was normal [Supple] : the neck was supple [Thyroid Not Enlarged] : the thyroid was not enlarged [No Thyroid Nodules] : no palpable thyroid nodules [Clear to Auscultation] : lungs were clear to auscultation bilaterally [Normal S1, S2] : normal S1 and S2 [Normal Rate] : heart rate was normal [Regular Rhythm] : with a regular rhythm [No Edema] : no peripheral edema [Gynecomastia] : gynecomastia present [Normal Bowel Sounds] : normal bowel sounds [Not Tender] : non-tender [Not Distended] : not distended [Soft] : abdomen soft [Penis Abnormality] : the penis was normal [Testes Swelling] : the testicles were not swollen [Scrotum] : the scrotum was normal [Testes Mass (___cm)] : there were no testicular masses [Normal Anterior Cervical Nodes] : no anterior cervical lymphadenopathy [No Spinal Tenderness] : no spinal tenderness [Spine Straight] : spine straight [No Stigmata of Cushings Syndrome] : no stigmata of Cushings Syndrome [Normal Gait] : normal gait [Normal Reflexes] : deep tendon reflexes were 2+ and symmetric [No Tremors] : no tremors [Oriented x3] : oriented to person, place, and time [Abdominal Striae] : no abdominal striae [Hirsutism] : no hirsutism

## 2021-12-09 LAB
ALBUMIN SERPL ELPH-MCNC: 4.3 G/DL
ALP BLD-CCNC: 69 U/L
ALT SERPL-CCNC: 14 U/L
ANION GAP SERPL CALC-SCNC: 13 MMOL/L
AST SERPL-CCNC: 15 U/L
BASOPHILS # BLD AUTO: 0.03 K/UL
BASOPHILS NFR BLD AUTO: 0.3 %
BILIRUB SERPL-MCNC: 0.2 MG/DL
BUN SERPL-MCNC: 11 MG/DL
CALCIUM SERPL-MCNC: 9.2 MG/DL
CHLORIDE SERPL-SCNC: 102 MMOL/L
CO2 SERPL-SCNC: 28 MMOL/L
CREAT SERPL-MCNC: 0.79 MG/DL
EOSINOPHIL # BLD AUTO: 0.16 K/UL
EOSINOPHIL NFR BLD AUTO: 1.7 %
GLUCOSE SERPL-MCNC: 127 MG/DL
HCT VFR BLD CALC: 45.2 %
HGB BLD-MCNC: 13.5 G/DL
IMM GRANULOCYTES NFR BLD AUTO: 0.7 %
LYMPHOCYTES # BLD AUTO: 1.83 K/UL
LYMPHOCYTES NFR BLD AUTO: 20 %
MAN DIFF?: NORMAL
MCHC RBC-ENTMCNC: 25 PG
MCHC RBC-ENTMCNC: 29.9 GM/DL
MCV RBC AUTO: 83.7 FL
MONOCYTES # BLD AUTO: 0.86 K/UL
MONOCYTES NFR BLD AUTO: 9.4 %
NEUTROPHILS # BLD AUTO: 6.21 K/UL
NEUTROPHILS NFR BLD AUTO: 67.9 %
PLATELET # BLD AUTO: 223 K/UL
POTASSIUM SERPL-SCNC: 4.1 MMOL/L
PROLACTIN SERPL-MCNC: 1814 NG/ML
PROT SERPL-MCNC: 6.3 G/DL
RBC # BLD: 5.4 M/UL
RBC # FLD: 13.2 %
SODIUM SERPL-SCNC: 143 MMOL/L
TESTOST SERPL-MCNC: 29.2 NG/DL
WBC # FLD AUTO: 9.15 K/UL

## 2022-01-17 NOTE — ED PROVIDER NOTE - DATE/TIME 1
[facility] Hospitalist Progress Note    SUBJECTIVE:     Patient relates less sob, denied nausea or vomiting, denied chest pain     OBJECTIVE:    Current Facility-Administered Medications   Medication   • polyethylene glycol (MIRALAX) packet 17 g   • bisacodyl (DULCOLAX) suppository 10 mg   • senna (SENOKOT) 8.6 mg   • furosemide (LASIX INJECT) injection 20 mg   • dexamethasone (DECADRON) injection 10 mg   • acyclovir (ZOVIRAX) capsule 400 mg   • HYDROcodone-acetaminophen (NORCO)  MG per tablet 1 tablet   • benzonatate (TESSALON PERLES) capsule 100 mg   • sodium chloride 0.9 % flush bag 25 mL   • sodium chloride (PF) 0.9 % injection 2 mL   • pantoprazole (PROTONIX) EC tablet 40 mg   • albuterol inhaler 2 puff   • [Held by provider] bumetanide (BUMEX) tablet 2 mg   • levothyroxine (SYNTHROID, LEVOTHROID) tablet 112 mcg   • tiZANidine (ZANAFLEX) tablet 2 mg   • lidocaine (XYLOCAINE) 5 % ointment 1 each   • sodium chloride (NORMAL SALINE) 0.9 % bolus 500 mL   • enoxaparin (LOVENOX) injection 40 mg   • guaiFENesin (MUCINEX) ER tablet 1,200 mg   • ondansetron (ZOFRAN) injection 4 mg   • cholecalciferol (VITAMIN D) tablet 50 mcg       I/O's    Intake/Output Summary (Last 24 hours) at 1/17/2022 1048  Last data filed at 1/17/2022 0934  Gross per 24 hour   Intake 520 ml   Output 2875 ml   Net -2355 ml       Last Recorded Vitals  Vital Last Value 24 Hour Range   Temperature 98.2 °F (36.8 °C) (01/17/22 0803) Temp  Min: 97.7 °F (36.5 °C)  Max: 98.2 °F (36.8 °C)   Pulse 77 (01/17/22 0803) Pulse  Min: 55  Max: 90   Respiratory 16 (01/17/22 0128) Resp  Min: 16  Max: 18   Non-Invasive  Blood Pressure 129/87 (01/17/22 0803) BP  Min: 112/64  Max: 130/74   Pulse Oximetry 92 % (01/17/22 0803) SpO2  Min: 92 %  Max: 100 %     Vital Today Admitted   Weight (!) 138.6 kg (305 lb 8.9 oz) (12/24/21 1515) Weight: (!) 136.7 kg (301 lb 5.9 oz) (12/24/21 1043)   Height N/A Height: 5' 6\" (167.6 cm) (12/24/21 1515)   BMI N/A BMI (Calculated): 49.32  (12/24/21 5705)       with given history of Covid 19 pneumonia.  No pleural effusion.  No pneumothorax. The cardiomediastinal silhouette is normal in size. Pulmonary vascularity is within normal limits.      No significant interval change compared to prior examination. Electronically Signed by: RHONA WILLIAMSON M.D. Signed on: 1/13/2022 10:41 AM     ASSESSMENT/PLAN:  Acute hypoxemic respiratory failure, 2/2 COVID 19 infection   - Pt desatted into the 80%'s yesterday PM while moving up the chair, O2 was increased to 15L   - Oxygenating on 9L HFNC, wean as tolerated  - ESR 43 (1/14).  -> 300 and Ferritin 407 -> 266 (on 1/11). IL-6 50.1 -> 7.4 (on 1/14)  - CT chest (1/15) noted Extensive b/l airspace infiltrates and groundglass opacities suspicious for pna likely Covid 19 etiology. Cardiomegaly.  - Continue w/ acyclovir ppx. S/p remdesivir and barcitinib  - S/p Actemra (12/28)  - S/p abx with IV Zithromax  - Steroid tx w/ IV Decadron 10mg daily   - Diuresis w/ IV Lasix 20 mg daily  - Respiratory support w/ Tessalon Perles, Mucinex, and PRN albuterol  - Supplement with Vit D  - Bowel regimen with scheduled Miralax BID, PRN Senokot, and PRN Dulcolax suppository  - IS use encouraged  - Monitor w/ telemetry  - Maintaining contact/droplet isolation precautions  - Pulmonology on consult    Sepsis, POA, 2/2 above  - AEB Intermittent tachycardia (improving)  - Further mgmt as noted above      REYNALDO (resolved), multifactorial likely 2/2 ATN from infection, prerenal causes, post-renal causes, diuresis, and rhabdomyolysis  - Cr still remains WNL (baseline around 0.7~0.9)  - Diuresis with IV Lasix. Hold PRN bumex  - Avoiding further nephrotoxins as able     Hyperglycemia, likely steroid induced  - BG's in high 100's this AM. Monitoring off SSI at this time     Atypical chest pain, possibly 2/2 above  - Pain mgmt with PRN Norco, PRN lidocaine, and PRN lidocaine     Hyponatremia, likely due to poor solute intake  - Na 131 --> 132 today    Hypermagnesemia,  Vj Espinal DO     Charting performed by anshu Ramirez for Dr. Juanito Mclean.    15-Nov-2019 04:47

## 2022-03-31 ENCOUNTER — APPOINTMENT (OUTPATIENT)
Dept: ENDOCRINOLOGY | Facility: CLINIC | Age: 52
End: 2022-03-31
Payer: COMMERCIAL

## 2022-03-31 VITALS
BODY MASS INDEX: 33.57 KG/M2 | TEMPERATURE: 96.8 F | SYSTOLIC BLOOD PRESSURE: 120 MMHG | HEART RATE: 72 BPM | DIASTOLIC BLOOD PRESSURE: 78 MMHG | OXYGEN SATURATION: 98 % | WEIGHT: 298 LBS

## 2022-03-31 DIAGNOSIS — N62 HYPERTROPHY OF BREAST: ICD-10-CM

## 2022-03-31 PROCEDURE — 99214 OFFICE O/P EST MOD 30 MIN: CPT

## 2022-04-01 ENCOUNTER — LABORATORY RESULT (OUTPATIENT)
Age: 52
End: 2022-04-01

## 2022-04-01 LAB
ALBUMIN SERPL ELPH-MCNC: 4.5 G/DL
ALP BLD-CCNC: 72 U/L
ALT SERPL-CCNC: 15 U/L
ANION GAP SERPL CALC-SCNC: 14 MMOL/L
AST SERPL-CCNC: 16 U/L
BILIRUB SERPL-MCNC: 0.3 MG/DL
BUN SERPL-MCNC: 11 MG/DL
CALCIUM SERPL-MCNC: 9.2 MG/DL
CHLORIDE SERPL-SCNC: 103 MMOL/L
CO2 SERPL-SCNC: 26 MMOL/L
CORTIS SERPL-MCNC: 7.2 UG/DL
CREAT SERPL-MCNC: 0.75 MG/DL
DHEA-S SERPL-MCNC: 160 UG/DL
EGFR: 109 ML/MIN/1.73M2
GLUCOSE SERPL-MCNC: 113 MG/DL
POTASSIUM SERPL-SCNC: 4.2 MMOL/L
PROLACTIN SERPL-MCNC: 3003 NG/ML
PROT SERPL-MCNC: 6.5 G/DL
SODIUM SERPL-SCNC: 142 MMOL/L
T3RU NFR SERPL: 1.2 TBI
T4 SERPL-MCNC: 7.1 UG/DL
TESTOST SERPL-MCNC: 51.4 NG/DL
TSH SERPL-ACNC: 1.53 UIU/ML

## 2022-04-01 NOTE — END OF VISIT
[FreeTextEntry3] : I, Long Garland, authored this note working as a medical scribe for Dr. Ngo.  03/31/2022.  4:00PM. This note was authored by the medical scribe for me. I have reviewed, edited, and revised the note as needed. I am in agreement with the exam findings, imaging findings, and treatment plan.  Jacob Ngo MD

## 2022-04-01 NOTE — PHYSICAL EXAM
[Alert] : alert [Well Nourished] : well nourished [Obese] : obese [No Acute Distress] : no acute distress [Well Developed] : well developed [Normal Sclera/Conjunctiva] : normal sclera/conjunctiva [EOMI] : extra ocular movement intact [No Proptosis] : no proptosis [Normal Outer Ear/Nose] : the ears and nose were normal in appearance [Normal Hearing] : hearing was normal [Supple] : the neck was supple [Thyroid Not Enlarged] : the thyroid was not enlarged [No Thyroid Nodules] : no palpable thyroid nodules [Clear to Auscultation] : lungs were clear to auscultation bilaterally [Normal S1, S2] : normal S1 and S2 [Normal Rate] : heart rate was normal [Regular Rhythm] : with a regular rhythm [No Edema] : no peripheral edema [Gynecomastia] : gynecomastia present [Normal Bowel Sounds] : normal bowel sounds [Not Tender] : non-tender [Not Distended] : not distended [Soft] : abdomen soft [Penis Abnormality] : the penis was normal [Testes Swelling] : the testicles were not swollen [Scrotum] : the scrotum was normal [Testes Mass (___cm)] : there were no testicular masses [Normal Anterior Cervical Nodes] : no anterior cervical lymphadenopathy [No Spinal Tenderness] : no spinal tenderness [Spine Straight] : spine straight [No Stigmata of Cushings Syndrome] : no stigmata of Cushings Syndrome [Normal Gait] : normal gait [Normal Reflexes] : deep tendon reflexes were 2+ and symmetric [No Tremors] : no tremors [Oriented x3] : oriented to person, place, and time [de-identified] : small disk gynecomastia

## 2022-04-01 NOTE — ASSESSMENT
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  Did not tolerate increased dose of cabergoline.  Previous work-up suggested hypopituitarism.  Patient had been on prednisone but stopped unknown for unclear reasons.  The patient does not have overt symptoms of adrenal insufficiency such as hypotension even off medications.  He does have hypogonadism and notes some gynecomastia.\par The patient is requesting referral to neurosurgery.  It is reasonable to consider a surgical approach given his intolerance of cabergoline at higher doses and persistent prolactin elevation.  The patient is due for repeat imaging but I will defer this to neurosurgery consultation.. I recommend pt f/u w/ neurosurgery Dr. Trevor Martell for further evaluation.\par \par Request labs sent out. Will include AM cortisol test.\par \par F/u TBD until pt has f/u w/ Dr. Trevor Martell

## 2022-04-01 NOTE — HISTORY OF PRESENT ILLNESS
[FreeTextEntry1] : No significant interval health changes. No interval surgery, hospitalizations, fractures, or change in medications.\par History of pituitary macroadenoma and hyperprolactinemia. \par CT 7/2021 consistent with a pituitary macro adenoma. The mass measures 2.9 x 2.4 x 2.7 cm.  Initial endocrine work-up showed low cortisol and low testosterone.  Last prolactin level was elevated at 1800 although decreased from maximum of greater than 4000.  I recommended increasing cabergoline to 2 pills twice weekly but patient did not tolerate and reduce back to once twice a week.  He had a low cortisol in the past.  He was on prednisone but stopped on his own for unclear reasons.\par No new symptoms.  No headache.  Saw ophthalmology December 2021.H/o prior left retinal tear with some loss of vision in L eye.. No reported change in visual fields.  Notes some gynecomastia.\par \par

## 2022-04-20 ENCOUNTER — APPOINTMENT (OUTPATIENT)
Dept: SPINE | Facility: CLINIC | Age: 52
End: 2022-04-20
Payer: COMMERCIAL

## 2022-04-20 VITALS
BODY MASS INDEX: 34.59 KG/M2 | SYSTOLIC BLOOD PRESSURE: 121 MMHG | OXYGEN SATURATION: 95 % | HEART RATE: 58 BPM | HEIGHT: 78 IN | DIASTOLIC BLOOD PRESSURE: 77 MMHG | WEIGHT: 299 LBS

## 2022-04-20 PROCEDURE — 99204 OFFICE O/P NEW MOD 45 MIN: CPT

## 2022-04-20 RX ORDER — PREDNISONE 5 MG/1
5 TABLET ORAL DAILY
Qty: 90 | Refills: 0 | Status: DISCONTINUED | COMMUNITY
Start: 2021-06-30 | End: 2022-04-20

## 2022-04-20 RX ORDER — OXYCODONE HYDROCHLORIDE 30 MG/1
TABLET ORAL
Refills: 0 | Status: DISCONTINUED | COMMUNITY
End: 2022-04-20

## 2022-05-06 LAB — SARS-COV-2 N GENE NPH QL NAA+PROBE: NOT DETECTED

## 2022-05-09 ENCOUNTER — APPOINTMENT (OUTPATIENT)
Dept: MRI IMAGING | Facility: CLINIC | Age: 52
End: 2022-05-09

## 2022-06-08 ENCOUNTER — APPOINTMENT (OUTPATIENT)
Dept: MRI IMAGING | Facility: CLINIC | Age: 52
End: 2022-06-08
Payer: COMMERCIAL

## 2022-06-08 ENCOUNTER — OUTPATIENT (OUTPATIENT)
Dept: OUTPATIENT SERVICES | Facility: HOSPITAL | Age: 52
LOS: 1 days | End: 2022-06-08
Payer: COMMERCIAL

## 2022-06-08 DIAGNOSIS — Z00.8 ENCOUNTER FOR OTHER GENERAL EXAMINATION: ICD-10-CM

## 2022-06-08 DIAGNOSIS — E22.1 HYPERPROLACTINEMIA: ICD-10-CM

## 2022-06-08 DIAGNOSIS — H33.021 RETINAL DETACHMENT WITH MULTIPLE BREAKS, RIGHT EYE: Chronic | ICD-10-CM

## 2022-06-08 DIAGNOSIS — H33.052 TOTAL RETINAL DETACHMENT, LEFT EYE: Chronic | ICD-10-CM

## 2022-06-08 DIAGNOSIS — D35.2 BENIGN NEOPLASM OF PITUITARY GLAND: ICD-10-CM

## 2022-06-08 PROCEDURE — A9585: CPT

## 2022-06-08 PROCEDURE — 70553 MRI BRAIN STEM W/O & W/DYE: CPT | Mod: 26

## 2022-06-08 PROCEDURE — 70553 MRI BRAIN STEM W/O & W/DYE: CPT

## 2022-08-10 ENCOUNTER — APPOINTMENT (OUTPATIENT)
Dept: SPINE | Facility: CLINIC | Age: 52
End: 2022-08-10

## 2022-08-10 VITALS
BODY MASS INDEX: 34.59 KG/M2 | WEIGHT: 299 LBS | SYSTOLIC BLOOD PRESSURE: 116 MMHG | DIASTOLIC BLOOD PRESSURE: 78 MMHG | HEIGHT: 78 IN | OXYGEN SATURATION: 99 % | HEART RATE: 62 BPM

## 2022-08-10 PROCEDURE — 99213 OFFICE O/P EST LOW 20 MIN: CPT

## 2022-09-01 ENCOUNTER — LABORATORY RESULT (OUTPATIENT)
Age: 52
End: 2022-09-01

## 2022-09-01 ENCOUNTER — APPOINTMENT (OUTPATIENT)
Dept: ENDOCRINOLOGY | Facility: CLINIC | Age: 52
End: 2022-09-01

## 2022-09-01 VITALS
WEIGHT: 278 LBS | TEMPERATURE: 97 F | SYSTOLIC BLOOD PRESSURE: 126 MMHG | DIASTOLIC BLOOD PRESSURE: 74 MMHG | BODY MASS INDEX: 31.32 KG/M2 | OXYGEN SATURATION: 98 % | HEART RATE: 52 BPM

## 2022-09-01 DIAGNOSIS — E29.1 TESTICULAR HYPOFUNCTION: ICD-10-CM

## 2022-09-01 PROCEDURE — 99214 OFFICE O/P EST MOD 30 MIN: CPT

## 2022-09-02 NOTE — HISTORY OF PRESENT ILLNESS
[FreeTextEntry1] : Patient returns for a follow up visit for a pituitary disorder.  \par \par History of pituitary macroadenoma and hyperprolactinemia. \par CT 7/2021 consistent with a pituitary macro adenoma. The mass measures 2.9 x 2.4 x 2.7 cm.  Initial endocrine work-up showed low cortisol and low testosterone.  Patient had been on cabergoline for markedly elevated hyperprolactinemia but then stopped due to apparent intolerance.  He recently saw neurosurgery Dr. Martell.  MRI appears to show increased size of lesion fortunately moving downward not into optic chiasm.  Patient has no obvious change in symptoms such as new headaches or change in visual fields.\par  .  Saw ophthalmology December 2021.H/o prior left retinal tear with some loss of vision in L eye.. No reported change in visual fields.  Notes some gynecomastia.\par \par

## 2022-09-02 NOTE — PHYSICAL EXAM
[Alert] : alert [Well Nourished] : well nourished [Obese] : obese [No Acute Distress] : no acute distress [Well Developed] : well developed [Normal Sclera/Conjunctiva] : normal sclera/conjunctiva [EOMI] : extra ocular movement intact [No Proptosis] : no proptosis [Normal Outer Ear/Nose] : the ears and nose were normal in appearance [Normal Hearing] : hearing was normal [Supple] : the neck was supple [Thyroid Not Enlarged] : the thyroid was not enlarged [No Thyroid Nodules] : no palpable thyroid nodules [Clear to Auscultation] : lungs were clear to auscultation bilaterally [Normal S1, S2] : normal S1 and S2 [Normal Rate] : heart rate was normal [Regular Rhythm] : with a regular rhythm [No Edema] : no peripheral edema [Gynecomastia] : gynecomastia present [Normal Bowel Sounds] : normal bowel sounds [Not Tender] : non-tender [Not Distended] : not distended [Soft] : abdomen soft [Penis Abnormality] : the penis was normal [Testes Swelling] : the testicles were not swollen [Scrotum] : the scrotum was normal [Testes Mass (___cm)] : there were no testicular masses [Normal Anterior Cervical Nodes] : no anterior cervical lymphadenopathy [No Spinal Tenderness] : no spinal tenderness [Spine Straight] : spine straight [No Stigmata of Cushings Syndrome] : no stigmata of Cushings Syndrome [Normal Gait] : normal gait [Normal Reflexes] : deep tendon reflexes were 2+ and symmetric [No Tremors] : no tremors [Oriented x3] : oriented to person, place, and time [de-identified] : small disk gynecomastia

## 2022-09-02 NOTE — ASSESSMENT
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  Recent MRI from Dr. Trevor Martell shows increased size of gland.\par I have recommended a trial of bromocriptine but patient would like to retry cabergoline.  Will restart cabergoline repeat prolactin in 1 month.  Although we prefer not to do surgery for prolactinoma as this may be necessary patient is intolerant to medication.\par \par \par F/u 1 month  with NP

## 2022-09-02 NOTE — END OF VISIT
[FreeTextEntry3] : This note was written by Lissette Spencer on ( September 1, 2022) acting as a medical scribe for Dr. Ngo  This note was authored by the medical scribe for me. I have reviewed, edited, and revised the note as needed. I am in agreement with the exam findings, imaging findings, and treatment plan.  Jacob Ngo MD

## 2022-09-06 LAB
ALBUMIN SERPL ELPH-MCNC: 4.4 G/DL
ALP BLD-CCNC: 92 U/L
ALT SERPL-CCNC: 7 U/L
ANION GAP SERPL CALC-SCNC: 14 MMOL/L
AST SERPL-CCNC: 12 U/L
BILIRUB SERPL-MCNC: 0.2 MG/DL
BUN SERPL-MCNC: 16 MG/DL
CALCIUM SERPL-MCNC: 9.5 MG/DL
CHLORIDE SERPL-SCNC: 102 MMOL/L
CO2 SERPL-SCNC: 26 MMOL/L
CORTIS SERPL-MCNC: 0.4 UG/DL
CREAT SERPL-MCNC: 0.9 MG/DL
EGFR: 103 ML/MIN/1.73M2
GLUCOSE SERPL-MCNC: 97 MG/DL
IGF-1 INTERP: NORMAL
IGF-I BLD-MCNC: 188 NG/ML
POTASSIUM SERPL-SCNC: 4.4 MMOL/L
PROLACTIN SERPL-MCNC: >4700 NG/ML
PROT SERPL-MCNC: 6.7 G/DL
SODIUM SERPL-SCNC: 143 MMOL/L
T3RU NFR SERPL: 1.2 TBI
T4 SERPL-MCNC: 7.4 UG/DL
TESTOST SERPL-MCNC: <2.5 NG/DL
TSH SERPL-ACNC: 2.25 UIU/ML

## 2022-10-14 ENCOUNTER — APPOINTMENT (OUTPATIENT)
Dept: ENDOCRINOLOGY | Facility: CLINIC | Age: 52
End: 2022-10-14

## 2022-12-16 ENCOUNTER — NON-APPOINTMENT (OUTPATIENT)
Age: 52
End: 2022-12-16

## 2023-01-06 ENCOUNTER — RX RENEWAL (OUTPATIENT)
Age: 53
End: 2023-01-06

## 2023-01-12 ENCOUNTER — APPOINTMENT (OUTPATIENT)
Dept: ENDOCRINOLOGY | Facility: CLINIC | Age: 53
End: 2023-01-12

## 2023-06-28 ENCOUNTER — APPOINTMENT (OUTPATIENT)
Dept: ENDOCRINOLOGY | Facility: CLINIC | Age: 53
End: 2023-06-28

## 2023-07-20 LAB
ACTH-ESO: 7.8 PG/ML
ALBUMIN SERPL ELPH-MCNC: 4.5 G/DL
ALP BLD-CCNC: 84 U/L
ALT SERPL-CCNC: 11 U/L
ANION GAP SERPL CALC-SCNC: 14 MMOL/L
AST SERPL-CCNC: 17 U/L
BILIRUB SERPL-MCNC: 0.3 MG/DL
BUN SERPL-MCNC: 17 MG/DL
CALCIUM SERPL-MCNC: 9.2 MG/DL
CHLORIDE SERPL-SCNC: 101 MMOL/L
CO2 SERPL-SCNC: 25 MMOL/L
CORTIS SERPL-MCNC: 0.8 UG/DL
CREAT SERPL-MCNC: 0.8 MG/DL
EGFR: 106 ML/MIN/1.73M2
GLUCOSE SERPL-MCNC: 123 MG/DL
POTASSIUM SERPL-SCNC: 4.3 MMOL/L
PROLACTIN SERPL-MCNC: >4700 NG/ML
PROT SERPL-MCNC: 6.6 G/DL
SODIUM SERPL-SCNC: 140 MMOL/L
T4 FREE SERPL-MCNC: 1.1 NG/DL
TSH SERPL-ACNC: 0.88 UIU/ML

## 2023-08-07 ENCOUNTER — APPOINTMENT (OUTPATIENT)
Dept: ENDOCRINOLOGY | Facility: CLINIC | Age: 53
End: 2023-08-07
Payer: COMMERCIAL

## 2023-08-07 VITALS — HEART RATE: 62 BPM | SYSTOLIC BLOOD PRESSURE: 104 MMHG | DIASTOLIC BLOOD PRESSURE: 70 MMHG | OXYGEN SATURATION: 97 %

## 2023-08-07 VITALS — WEIGHT: 270 LBS | HEIGHT: 77 IN | BODY MASS INDEX: 31.88 KG/M2

## 2023-08-07 PROCEDURE — 99215 OFFICE O/P EST HI 40 MIN: CPT

## 2023-08-08 NOTE — ASSESSMENT
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   Patient was on cabergoline then stopped for apparent side effects.  Recent MRI from Dr. Trevor Martell shows increased size of gland. Recent prolactin levels quite high despite prescription for cabergoline orders unclear if the patient is actually taking this regularly.  Labs suggested again low cortisol.  Patient has no current symptoms on physical exam suggestive hypoadrenalism.  I have asked for repeat 8 a.m. blood test.  If cortisol remains low consider low-dose steroid replacement.  .  Although we prefer not to do surgery for prolactinoma  this may be necessary if patient is not responsive to standard medication such as cabergoline..

## 2023-08-08 NOTE — HISTORY OF PRESENT ILLNESS
[FreeTextEntry1] : Patient returns for a follow up visit for a pituitary disorder.    History of pituitary macroadenoma and hyperprolactinemia.  CT 7/2021 consistent with a pituitary macro adenoma. The mass measures 2.9 x 2.4 x 2.7 cm.  Initial endocrine work-up showed low cortisol and low testosterone.  Patient had been on cabergoline for markedly elevated hyperprolactinemia but then stopped due to apparent intolerance.  He restarted therapy and it is unclear if he is taking regularly. Recent prolactin was greater than 4700; serum cortisol was low at 0.3.  Patient has no overt symptoms suggestive of adrenal insufficiency My colitis he recently saw neurosurgery Dr. Martell.  MRI appears to show increased size of lesion fortunately moving downward not into optic chiasm.  Patient has no obvious change in symptoms such as new headaches or change in visual fields.  .  Saw ophthalmology December 2021.H/o prior left retinal tear with some loss of vision in L eye.. No reported change in visual fields.  Notes some gynecomastia.

## 2023-08-08 NOTE — PHYSICAL EXAM
[Alert] : alert [Well Nourished] : well nourished [Obese] : obese [No Acute Distress] : no acute distress [Well Developed] : well developed [Normal Sclera/Conjunctiva] : normal sclera/conjunctiva [EOMI] : extra ocular movement intact [No Proptosis] : no proptosis [Normal Outer Ear/Nose] : the ears and nose were normal in appearance [Normal Hearing] : hearing was normal [Supple] : the neck was supple [Thyroid Not Enlarged] : the thyroid was not enlarged [No Thyroid Nodules] : no palpable thyroid nodules [Clear to Auscultation] : lungs were clear to auscultation bilaterally [Normal S1, S2] : normal S1 and S2 [Normal Rate] : heart rate was normal [Regular Rhythm] : with a regular rhythm [No Edema] : no peripheral edema [Normal Bowel Sounds] : normal bowel sounds [Not Tender] : non-tender [Not Distended] : not distended [Soft] : abdomen soft [Penis Abnormality] : the penis was normal [Testes Swelling] : the testicles were not swollen [Scrotum] : the scrotum was normal [Testes Mass (___cm)] : there were no testicular masses [Normal Anterior Cervical Nodes] : no anterior cervical lymphadenopathy [No Spinal Tenderness] : no spinal tenderness [Spine Straight] : spine straight [No Stigmata of Cushings Syndrome] : no stigmata of Cushings Syndrome [Normal Gait] : normal gait [Normal Reflexes] : deep tendon reflexes were 2+ and symmetric [No Tremors] : no tremors [Oriented x3] : oriented to person, place, and time [Gynecomastia] : gynecomastia present [de-identified] : Decreased lateral vision left eye [de-identified] :  Mild [de-identified] : small disk gynecomastia

## 2023-09-01 ENCOUNTER — LABORATORY RESULT (OUTPATIENT)
Age: 53
End: 2023-09-01

## 2023-09-01 LAB
ALBUMIN SERPL ELPH-MCNC: 4.6 G/DL
ALP BLD-CCNC: 67 U/L
ALT SERPL-CCNC: 13 U/L
ANION GAP SERPL CALC-SCNC: 12 MMOL/L
AST SERPL-CCNC: 16 U/L
BILIRUB SERPL-MCNC: 0.7 MG/DL
BUN SERPL-MCNC: 14 MG/DL
CALCIUM SERPL-MCNC: 9.3 MG/DL
CHLORIDE SERPL-SCNC: 100 MMOL/L
CO2 SERPL-SCNC: 29 MMOL/L
CORTIS SERPL-MCNC: 2.9 UG/DL
CREAT SERPL-MCNC: 0.83 MG/DL
DHEA-S SERPL-MCNC: 36.2 UG/DL
EGFR: 105 ML/MIN/1.73M2
GLUCOSE SERPL-MCNC: 111 MG/DL
HCT VFR BLD CALC: 44.4 %
HGB BLD-MCNC: 13.6 G/DL
MCHC RBC-ENTMCNC: 25.3 PG
MCHC RBC-ENTMCNC: 30.6 GM/DL
MCV RBC AUTO: 82.7 FL
PLATELET # BLD AUTO: 234 K/UL
POTASSIUM SERPL-SCNC: 4.3 MMOL/L
PROLACTIN SERPL-MCNC: 2157 NG/ML
PROT SERPL-MCNC: 6.6 G/DL
RBC # BLD: 5.37 M/UL
RBC # FLD: 14.2 %
SODIUM SERPL-SCNC: 141 MMOL/L
T3RU NFR SERPL: 1.1 TBI
T4 SERPL-MCNC: 8.2 UG/DL
TESTOST SERPL-MCNC: 11 NG/DL
TSH SERPL-ACNC: 1.87 UIU/ML
WBC # FLD AUTO: 8.34 K/UL

## 2023-09-05 LAB — ACTH SER-ACNC: 17.6 PG/ML

## 2023-10-02 RX ORDER — CABERGOLINE 0.5 MG/1
0.5 TABLET ORAL
Qty: 16 | Refills: 2 | Status: ACTIVE | COMMUNITY
Start: 2020-11-23 | End: 1900-01-01

## 2023-10-04 RX ORDER — PREDNISONE 5 MG/1
5 TABLET ORAL
Qty: 90 | Refills: 3 | Status: ACTIVE | COMMUNITY
Start: 2023-10-04 | End: 1900-01-01

## 2023-10-30 ENCOUNTER — NON-APPOINTMENT (OUTPATIENT)
Age: 53
End: 2023-10-30

## 2023-11-15 ENCOUNTER — APPOINTMENT (OUTPATIENT)
Dept: ENDOCRINOLOGY | Facility: CLINIC | Age: 53
End: 2023-11-15

## 2024-01-03 ENCOUNTER — APPOINTMENT (OUTPATIENT)
Dept: ENDOCRINOLOGY | Facility: CLINIC | Age: 54
End: 2024-01-03
Payer: COMMERCIAL

## 2024-01-03 VITALS
DIASTOLIC BLOOD PRESSURE: 88 MMHG | HEIGHT: 77 IN | BODY MASS INDEX: 31.41 KG/M2 | WEIGHT: 266 LBS | HEART RATE: 49 BPM | SYSTOLIC BLOOD PRESSURE: 122 MMHG | OXYGEN SATURATION: 99 %

## 2024-01-03 DIAGNOSIS — E23.0 HYPOPITUITARISM: ICD-10-CM

## 2024-01-03 PROCEDURE — 99214 OFFICE O/P EST MOD 30 MIN: CPT

## 2024-01-04 NOTE — PHYSICAL EXAM
[Alert] : alert [Well Nourished] : well nourished [Obese] : obese [No Acute Distress] : no acute distress [Well Developed] : well developed [Normal Sclera/Conjunctiva] : normal sclera/conjunctiva [EOMI] : extra ocular movement intact [No Proptosis] : no proptosis [Normal Outer Ear/Nose] : the ears and nose were normal in appearance [Normal Hearing] : hearing was normal [Supple] : the neck was supple [Thyroid Not Enlarged] : the thyroid was not enlarged [No Thyroid Nodules] : no palpable thyroid nodules [Clear to Auscultation] : lungs were clear to auscultation bilaterally [Normal S1, S2] : normal S1 and S2 [Normal Rate] : heart rate was normal [Regular Rhythm] : with a regular rhythm [Gynecomastia] : gynecomastia present [Normal Bowel Sounds] : normal bowel sounds [Not Tender] : non-tender [Not Distended] : not distended [Soft] : abdomen soft [No Spinal Tenderness] : no spinal tenderness [Spine Straight] : spine straight [No Stigmata of Cushings Syndrome] : no stigmata of Cushings Syndrome [Normal Gait] : normal gait [Normal Reflexes] : deep tendon reflexes were 2+ and symmetric [No Tremors] : no tremors [Oriented x3] : oriented to person, place, and time [de-identified] : Decreased peripheral vision left eye [de-identified] : non pitting edema in L leg. [de-identified] :  Mild [de-identified] : small disk gynecomastia

## 2024-01-04 NOTE — ASSESSMENT
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  Patient has been taking cabergoline consistently since last visit. MRI pituitary 6/2022 from Dr. Trevor Martell showed increased size of gland. Last prolactin levels were quite high but improved from prior prolactin. - Will check prolactin, TSH, FT4, IGF-1 - Reorder CT pituitary that was not done after last visit (patient unable to do MRI due to claustrophobia) - Although we prefer not to do surgery for prolactinoma this may be necessary if patient is not responsive to standard medication such as cabergoline. - Counseled to follow up with neurosurgery Dr. Martell and with ophthalmology.  Started on prednisone 5mg daily after last labs showed low cortisol and inappropriately normal ACTH likely due to secondary AI. - continue prednisone 5mg daily  Low testosterone with low libido, less erections but not affecting daily life and does not desire replacement at this time.

## 2024-01-04 NOTE — HISTORY OF PRESENT ILLNESS
[FreeTextEntry1] : Patient returns for a follow up visit for a pituitary disorder.    History of pituitary macroadenoma and hyperprolactinemia.  CT 7/2021 consistent with a pituitary macro adenoma. The mass measures 2.9 x 2.4 x 2.7 cm.  Initial endocrine work-up showed low cortisol and low testosterone.  Patient had been on cabergoline for markedly elevated hyperprolactinemia but then stopped due to apparent intolerance in the past, resumed taking consistently after last visit. Last MRI 2022 showed increased size of lesion (4.1 x 3.1 x 2.7cm) fortunately moving downward not into optic chiasm.  Patient has no obvious change in symptoms such as new headaches or change in visual fields. 9/2023: Total testosterone 11 low, PRL 2157 high but improved. Cortisol 2.9 low, ACTH 17.6 wnl. TSH 1.87, FTI, TT4, uptake wnl. DHEAS low 36.2. Since last visit he has been taking cabergoline 1mg twice a week (Sunday and Wednesday/Thursday) Began prednisone 5mg daily. Feels like he has more energy. Follows with neurosurgery Dr. Martell, last saw 8/2022. Saw ophthalmology December 2021. H/o prior left retinal tear with some loss of vision in L eye. Did not get CT pituitary that was last ordered.  No reported change in visual fields. No headache.

## 2024-01-23 ENCOUNTER — LABORATORY RESULT (OUTPATIENT)
Age: 54
End: 2024-01-23

## 2024-03-02 ENCOUNTER — APPOINTMENT (OUTPATIENT)
Dept: CT IMAGING | Facility: IMAGING CENTER | Age: 54
End: 2024-03-02

## 2024-03-04 DIAGNOSIS — E22.1 HYPERPROLACTINEMIA: ICD-10-CM

## 2024-03-04 DIAGNOSIS — D35.2 BENIGN NEOPLASM OF PITUITARY GLAND: ICD-10-CM

## 2024-03-19 ENCOUNTER — NON-APPOINTMENT (OUTPATIENT)
Age: 54
End: 2024-03-19

## 2024-04-20 ENCOUNTER — OUTPATIENT (OUTPATIENT)
Dept: OUTPATIENT SERVICES | Facility: HOSPITAL | Age: 54
LOS: 1 days | End: 2024-04-20
Payer: COMMERCIAL

## 2024-04-20 ENCOUNTER — APPOINTMENT (OUTPATIENT)
Dept: CT IMAGING | Facility: IMAGING CENTER | Age: 54
End: 2024-04-20
Payer: COMMERCIAL

## 2024-04-20 DIAGNOSIS — H33.021 RETINAL DETACHMENT WITH MULTIPLE BREAKS, RIGHT EYE: Chronic | ICD-10-CM

## 2024-04-20 DIAGNOSIS — H33.052 TOTAL RETINAL DETACHMENT, LEFT EYE: Chronic | ICD-10-CM

## 2024-04-20 DIAGNOSIS — E22.1 HYPERPROLACTINEMIA: ICD-10-CM

## 2024-04-20 DIAGNOSIS — D35.2 BENIGN NEOPLASM OF PITUITARY GLAND: ICD-10-CM

## 2024-04-20 PROCEDURE — 70482 CT ORBIT/EAR/FOSSA W/O&W/DYE: CPT

## 2024-04-20 PROCEDURE — 70482 CT ORBIT/EAR/FOSSA W/O&W/DYE: CPT | Mod: 26

## 2024-04-22 ENCOUNTER — NON-APPOINTMENT (OUTPATIENT)
Age: 54
End: 2024-04-22

## 2024-05-01 ENCOUNTER — APPOINTMENT (OUTPATIENT)
Dept: ENDOCRINOLOGY | Facility: CLINIC | Age: 54
End: 2024-05-01

## 2024-10-18 ENCOUNTER — RX RENEWAL (OUTPATIENT)
Age: 54
End: 2024-10-18

## 2024-10-18 NOTE — ED ADULT NURSE NOTE - NS ED NOTE ABUSE SUSPICION NEGLECT YN
For information on Fall & Injury Prevention, visit: [email or web address] OR  [email or web address] OR  https://www.cdc.gov/steadi/patient.html
No

## 2024-12-09 ENCOUNTER — RX RENEWAL (OUTPATIENT)
Age: 54
End: 2024-12-09

## 2024-12-12 ENCOUNTER — NON-APPOINTMENT (OUTPATIENT)
Age: 54
End: 2024-12-12

## 2025-03-06 ENCOUNTER — APPOINTMENT (OUTPATIENT)
Dept: PODIATRY | Facility: CLINIC | Age: 55
End: 2025-03-06

## 2025-03-06 DIAGNOSIS — M79.676 PAIN IN UNSPECIFIED TOE(S): ICD-10-CM

## 2025-03-06 DIAGNOSIS — R23.4 CHANGES IN SKIN TEXTURE: ICD-10-CM

## 2025-03-06 DIAGNOSIS — I83.10 VARICOSE VEINS OF UNSPECIFIED LOWER EXTREMITY WITH INFLAMMATION: ICD-10-CM

## 2025-03-06 DIAGNOSIS — B35.1 TINEA UNGUIUM: ICD-10-CM

## 2025-03-06 PROCEDURE — 11721 DEBRIDE NAIL 6 OR MORE: CPT

## 2025-03-06 PROCEDURE — 99203 OFFICE O/P NEW LOW 30 MIN: CPT | Mod: 25

## 2025-03-06 RX ORDER — AMCINONIDE 1 MG/G
0.1 CREAM TOPICAL 3 TIMES DAILY
Qty: 1 | Refills: 0 | Status: ACTIVE | COMMUNITY
Start: 2025-03-06 | End: 1900-01-01

## 2025-03-06 RX ORDER — CICLOPIROX 71.3 MG/ML
8 SOLUTION TOPICAL
Qty: 1 | Refills: 3 | Status: ACTIVE | COMMUNITY
Start: 2025-03-06 | End: 1900-01-01

## 2025-03-08 ENCOUNTER — OUTPATIENT (OUTPATIENT)
Dept: OUTPATIENT SERVICES | Facility: HOSPITAL | Age: 55
LOS: 1 days | End: 2025-03-08
Payer: COMMERCIAL

## 2025-03-08 ENCOUNTER — APPOINTMENT (OUTPATIENT)
Dept: ULTRASOUND IMAGING | Facility: CLINIC | Age: 55
End: 2025-03-08
Payer: COMMERCIAL

## 2025-03-08 DIAGNOSIS — H33.052 TOTAL RETINAL DETACHMENT, LEFT EYE: Chronic | ICD-10-CM

## 2025-03-08 DIAGNOSIS — Z00.00 ENCOUNTER FOR GENERAL ADULT MEDICAL EXAMINATION WITHOUT ABNORMAL FINDINGS: ICD-10-CM

## 2025-03-08 DIAGNOSIS — H33.021 RETINAL DETACHMENT WITH MULTIPLE BREAKS, RIGHT EYE: Chronic | ICD-10-CM

## 2025-03-08 PROCEDURE — 93970 EXTREMITY STUDY: CPT | Mod: 26

## 2025-03-08 PROCEDURE — 93970 EXTREMITY STUDY: CPT

## 2025-03-11 PROBLEM — I83.10 VARICOSE VEINS WITH INFLAMMATION: Status: ACTIVE | Noted: 2025-03-10

## 2025-03-11 PROBLEM — R23.4 SKIN FISSURE: Status: ACTIVE | Noted: 2025-03-10

## 2025-03-11 PROBLEM — M79.676 TOE PAIN: Status: ACTIVE | Noted: 2025-03-10

## 2025-03-11 PROBLEM — B35.1 ONYCHOMYCOSIS: Status: ACTIVE | Noted: 2025-03-10

## 2025-04-16 RX ORDER — HYDROCORTISONE 1 %
12 CREAM (GRAM) TOPICAL TWICE DAILY
Qty: 1 | Refills: 3 | Status: ACTIVE | COMMUNITY
Start: 2025-04-16 | End: 1900-01-01

## 2025-04-16 RX ORDER — AMCINONIDE 1 MG/G
0.1 CREAM TOPICAL 3 TIMES DAILY
Qty: 1 | Refills: 0 | Status: ACTIVE | COMMUNITY
Start: 2025-04-16 | End: 1900-01-01

## 2025-04-29 ENCOUNTER — RX RENEWAL (OUTPATIENT)
Age: 55
End: 2025-04-29

## 2025-04-30 ENCOUNTER — RX RENEWAL (OUTPATIENT)
Age: 55
End: 2025-04-30

## 2025-05-05 ENCOUNTER — RX RENEWAL (OUTPATIENT)
Age: 55
End: 2025-05-05

## 2025-09-16 ENCOUNTER — APPOINTMENT (OUTPATIENT)
Dept: PAIN MANAGEMENT | Facility: CLINIC | Age: 55
End: 2025-09-16